# Patient Record
Sex: FEMALE | Race: BLACK OR AFRICAN AMERICAN | NOT HISPANIC OR LATINO | Employment: OTHER | URBAN - METROPOLITAN AREA
[De-identification: names, ages, dates, MRNs, and addresses within clinical notes are randomized per-mention and may not be internally consistent; named-entity substitution may affect disease eponyms.]

---

## 2023-03-17 DIAGNOSIS — K21.9 GASTROESOPHAGEAL REFLUX DISEASE WITHOUT ESOPHAGITIS: ICD-10-CM

## 2023-03-17 DIAGNOSIS — I10 PRIMARY HYPERTENSION: Primary | ICD-10-CM

## 2023-03-17 RX ORDER — PANTOPRAZOLE SODIUM 40 MG/1
40 TABLET, DELAYED RELEASE ORAL DAILY
Qty: 30 TABLET | Refills: 0 | Status: SHIPPED | OUTPATIENT
Start: 2023-03-17 | End: 2023-04-25 | Stop reason: SDUPTHER

## 2023-03-17 RX ORDER — HYDROCHLOROTHIAZIDE 25 MG/1
25 TABLET ORAL DAILY
Qty: 30 TABLET | Refills: 0 | Status: SHIPPED | OUTPATIENT
Start: 2023-03-17 | End: 2023-04-14 | Stop reason: SDUPTHER

## 2023-03-17 RX ORDER — PANTOPRAZOLE SODIUM 40 MG/1
40 TABLET, DELAYED RELEASE ORAL DAILY
COMMUNITY
End: 2023-03-17 | Stop reason: SDUPTHER

## 2023-03-17 RX ORDER — HYDROCHLOROTHIAZIDE 25 MG/1
1 TABLET ORAL DAILY
COMMUNITY
Start: 2021-03-19 | End: 2023-03-17 | Stop reason: SDUPTHER

## 2023-03-20 DIAGNOSIS — I10 PRIMARY HYPERTENSION: Primary | ICD-10-CM

## 2023-03-23 RX ORDER — ASPIRIN 81 MG/1
81 TABLET ORAL DAILY
COMMUNITY
End: 2023-03-23 | Stop reason: SDUPTHER

## 2023-03-23 RX ORDER — ASPIRIN 81 MG/1
81 TABLET ORAL DAILY
Qty: 90 TABLET | Refills: 1 | Status: SHIPPED | OUTPATIENT
Start: 2023-03-23 | End: 2023-06-30 | Stop reason: SDUPTHER

## 2023-04-14 DIAGNOSIS — I10 PRIMARY HYPERTENSION: ICD-10-CM

## 2023-04-14 RX ORDER — HYDROCHLOROTHIAZIDE 25 MG/1
25 TABLET ORAL DAILY
Qty: 90 TABLET | Refills: 0 | Status: SHIPPED | OUTPATIENT
Start: 2023-04-14 | End: 2023-07-20 | Stop reason: SDUPTHER

## 2023-04-25 DIAGNOSIS — K21.9 GASTROESOPHAGEAL REFLUX DISEASE WITHOUT ESOPHAGITIS: ICD-10-CM

## 2023-04-26 RX ORDER — PANTOPRAZOLE SODIUM 40 MG/1
40 TABLET, DELAYED RELEASE ORAL DAILY
Qty: 30 TABLET | Refills: 2 | Status: SHIPPED | OUTPATIENT
Start: 2023-04-26 | End: 2023-08-07 | Stop reason: SDUPTHER

## 2023-05-10 ENCOUNTER — OFFICE VISIT (OUTPATIENT)
Dept: PRIMARY CARE | Facility: CLINIC | Age: 68
End: 2023-05-10
Payer: MEDICARE

## 2023-05-10 VITALS
HEIGHT: 64 IN | SYSTOLIC BLOOD PRESSURE: 132 MMHG | WEIGHT: 176.6 LBS | OXYGEN SATURATION: 97 % | HEART RATE: 72 BPM | TEMPERATURE: 97.2 F | DIASTOLIC BLOOD PRESSURE: 78 MMHG | BODY MASS INDEX: 30.15 KG/M2

## 2023-05-10 DIAGNOSIS — H61.22 IMPACTED CERUMEN OF LEFT EAR: Primary | ICD-10-CM

## 2023-05-10 PROCEDURE — 1036F TOBACCO NON-USER: CPT | Performed by: NURSE PRACTITIONER

## 2023-05-10 PROCEDURE — 1159F MED LIST DOCD IN RCRD: CPT | Performed by: NURSE PRACTITIONER

## 2023-05-10 PROCEDURE — 99214 OFFICE O/P EST MOD 30 MIN: CPT | Performed by: NURSE PRACTITIONER

## 2023-05-10 PROCEDURE — 1160F RVW MEDS BY RX/DR IN RCRD: CPT | Performed by: NURSE PRACTITIONER

## 2023-05-10 ASSESSMENT — PATIENT HEALTH QUESTIONNAIRE - PHQ9
SUM OF ALL RESPONSES TO PHQ9 QUESTIONS 1 AND 2: 0
2. FEELING DOWN, DEPRESSED OR HOPELESS: NOT AT ALL
1. LITTLE INTEREST OR PLEASURE IN DOING THINGS: NOT AT ALL

## 2023-05-10 ASSESSMENT — PAIN SCALES - GENERAL: PAINLEVEL: 0-NO PAIN

## 2023-05-10 NOTE — PROGRESS NOTES
"Subjective   Patient ID: Sharon Caraballo is a 67 y.o. female who presents for Ear Fullness.    Presents 3-4 days of left ear hearing changes.  Hx of cerumen impaction. Had been trying to use wax softening drops before ear became blocked.  Denies any recent illness.    Ear Fullness          Review of Systems    Objective   /78 (BP Location: Left arm, Patient Position: Sitting, BP Cuff Size: Small adult)   Pulse 72   Temp 36.2 °C (97.2 °F) (Oral)   Ht 1.626 m (5' 4\")   Wt 80.1 kg (176 lb 9.6 oz)   SpO2 97%   BMI 30.31 kg/m²     Physical Exam  Vitals and nursing note reviewed.   HENT:      Right Ear: Hearing, tympanic membrane, ear canal and external ear normal.      Left Ear: External ear normal. Decreased hearing noted. There is impacted cerumen.      Ears:      Comments: Post irrigation: residual wax but TM visible        Assessment/Plan   Diagnoses and all orders for this visit:  Impacted cerumen of left ear  -     Ear cerumen removal; Future         "

## 2023-05-10 NOTE — PATIENT INSTRUCTIONS
Left ear irrigated in office today. Unable to completely clear wax.   Refer to ENT for further management.

## 2023-06-30 DIAGNOSIS — I10 PRIMARY HYPERTENSION: ICD-10-CM

## 2023-07-01 RX ORDER — ASPIRIN 81 MG/1
81 TABLET ORAL DAILY
Qty: 90 TABLET | Refills: 1 | Status: SHIPPED | OUTPATIENT
Start: 2023-07-01 | End: 2023-12-27 | Stop reason: SDUPTHER

## 2023-07-20 DIAGNOSIS — I10 PRIMARY HYPERTENSION: ICD-10-CM

## 2023-07-20 RX ORDER — HYDROCHLOROTHIAZIDE 25 MG/1
25 TABLET ORAL DAILY
Qty: 90 TABLET | Refills: 0 | Status: SHIPPED | OUTPATIENT
Start: 2023-07-20 | End: 2023-12-29

## 2023-08-07 DIAGNOSIS — Z12.2 SCREENING FOR LUNG CANCER: Primary | ICD-10-CM

## 2023-08-07 DIAGNOSIS — K21.9 GASTROESOPHAGEAL REFLUX DISEASE WITHOUT ESOPHAGITIS: ICD-10-CM

## 2023-08-07 DIAGNOSIS — Z87.891 HISTORY OF TOBACCO USE: ICD-10-CM

## 2023-08-07 RX ORDER — PANTOPRAZOLE SODIUM 40 MG/1
40 TABLET, DELAYED RELEASE ORAL DAILY
Qty: 30 TABLET | Refills: 2 | Status: SHIPPED | OUTPATIENT
Start: 2023-08-07 | End: 2023-12-29

## 2023-09-27 ENCOUNTER — APPOINTMENT (OUTPATIENT)
Dept: PRIMARY CARE | Facility: CLINIC | Age: 68
End: 2023-09-27
Payer: MEDICARE

## 2023-11-06 ENCOUNTER — OFFICE VISIT (OUTPATIENT)
Dept: PRIMARY CARE | Facility: CLINIC | Age: 68
End: 2023-11-06
Payer: MEDICARE

## 2023-11-06 VITALS
TEMPERATURE: 97.5 F | OXYGEN SATURATION: 97 % | HEART RATE: 65 BPM | HEIGHT: 64 IN | BODY MASS INDEX: 29.23 KG/M2 | WEIGHT: 171.2 LBS | DIASTOLIC BLOOD PRESSURE: 78 MMHG | SYSTOLIC BLOOD PRESSURE: 118 MMHG

## 2023-11-06 DIAGNOSIS — R73.09 OTHER ABNORMAL GLUCOSE: ICD-10-CM

## 2023-11-06 DIAGNOSIS — J43.9 PULMONARY EMPHYSEMA, UNSPECIFIED EMPHYSEMA TYPE (MULTI): ICD-10-CM

## 2023-11-06 DIAGNOSIS — Z00.00 ROUTINE GENERAL MEDICAL EXAMINATION AT HEALTH CARE FACILITY: Primary | ICD-10-CM

## 2023-11-06 DIAGNOSIS — Z78.0 ASYMPTOMATIC MENOPAUSAL STATE: ICD-10-CM

## 2023-11-06 DIAGNOSIS — E04.0 GOITER DIFFUSE, NONTOXIC: ICD-10-CM

## 2023-11-06 DIAGNOSIS — Z11.59 NEED FOR HEPATITIS C SCREENING TEST: ICD-10-CM

## 2023-11-06 DIAGNOSIS — Z13.6 SCREENING FOR CARDIOVASCULAR CONDITION: ICD-10-CM

## 2023-11-06 DIAGNOSIS — Z13.1 DIABETES MELLITUS SCREENING: ICD-10-CM

## 2023-11-06 PROBLEM — R12 HEARTBURN: Status: ACTIVE | Noted: 2023-11-06

## 2023-11-06 PROBLEM — E78.2 MIXED HYPERLIPIDEMIA: Status: ACTIVE | Noted: 2023-11-06

## 2023-11-06 PROBLEM — H91.92 HEARING DIFFICULTY OF LEFT EAR: Status: ACTIVE | Noted: 2023-11-06

## 2023-11-06 PROBLEM — Z87.448 HISTORY OF PROLAPSE OF BLADDER: Status: ACTIVE | Noted: 2023-11-06

## 2023-11-06 PROBLEM — R73.03 PREDIABETES: Status: ACTIVE | Noted: 2023-11-06

## 2023-11-06 PROBLEM — N39.46 URGE AND STRESS INCONTINENCE: Status: ACTIVE | Noted: 2023-11-06

## 2023-11-06 PROCEDURE — 1126F AMNT PAIN NOTED NONE PRSNT: CPT | Performed by: NURSE PRACTITIONER

## 2023-11-06 PROCEDURE — G0438 PPPS, INITIAL VISIT: HCPCS | Performed by: NURSE PRACTITIONER

## 2023-11-06 PROCEDURE — 93000 ELECTROCARDIOGRAM COMPLETE: CPT | Performed by: NURSE PRACTITIONER

## 2023-11-06 PROCEDURE — 1170F FXNL STATUS ASSESSED: CPT | Performed by: NURSE PRACTITIONER

## 2023-11-06 PROCEDURE — 3008F BODY MASS INDEX DOCD: CPT | Performed by: NURSE PRACTITIONER

## 2023-11-06 PROCEDURE — 1160F RVW MEDS BY RX/DR IN RCRD: CPT | Performed by: NURSE PRACTITIONER

## 2023-11-06 PROCEDURE — 1159F MED LIST DOCD IN RCRD: CPT | Performed by: NURSE PRACTITIONER

## 2023-11-06 PROCEDURE — 1036F TOBACCO NON-USER: CPT | Performed by: NURSE PRACTITIONER

## 2023-11-06 RX ORDER — ALBUTEROL SULFATE 90 UG/1
2 AEROSOL, METERED RESPIRATORY (INHALATION) EVERY 4 HOURS PRN
Qty: 18 G | Refills: 1 | Status: SHIPPED | OUTPATIENT
Start: 2023-11-06 | End: 2023-12-06

## 2023-11-06 RX ORDER — ACETAMINOPHEN 500 MG
1 TABLET ORAL DAILY
COMMUNITY

## 2023-11-06 ASSESSMENT — ACTIVITIES OF DAILY LIVING (ADL)
BATHING: INDEPENDENT
DRESSING: INDEPENDENT
MANAGING_FINANCES: INDEPENDENT
GROCERY_SHOPPING: INDEPENDENT
DOING_HOUSEWORK: INDEPENDENT
TAKING_MEDICATION: INDEPENDENT
GROCERY_SHOPPING: INDEPENDENT
BATHING: INDEPENDENT
DOING_HOUSEWORK: INDEPENDENT
DRESSING: INDEPENDENT
TAKING_MEDICATION: INDEPENDENT
MANAGING_FINANCES: INDEPENDENT

## 2023-11-06 ASSESSMENT — PATIENT HEALTH QUESTIONNAIRE - PHQ9
2. FEELING DOWN, DEPRESSED OR HOPELESS: NOT AT ALL
2. FEELING DOWN, DEPRESSED OR HOPELESS: NOT AT ALL
1. LITTLE INTEREST OR PLEASURE IN DOING THINGS: NOT AT ALL
SUM OF ALL RESPONSES TO PHQ9 QUESTIONS 1 AND 2: 0
1. LITTLE INTEREST OR PLEASURE IN DOING THINGS: NOT AT ALL
SUM OF ALL RESPONSES TO PHQ9 QUESTIONS 1 AND 2: 0

## 2023-11-06 ASSESSMENT — PAIN SCALES - GENERAL: PAINLEVEL: 0-NO PAIN

## 2023-11-06 NOTE — PATIENT INSTRUCTIONS
Return for fasting blood work.    Schedule screening Bone Density scan and CT of your lungs for lung cancer screening    Immunizations are up to date.    EKG in office today. No changes from 3/2022.    Schedule routine eye exam.    BP well controlled on current medications  Follow a low salt diet.  Continue to check BP at home periodically.    Refill rescue inhaler to use as needed for shortness of breath.  Stay quit from smoking.    Follow up in 6 months for BP check, sooner as needed.

## 2023-11-06 NOTE — PROGRESS NOTES
"Subjective   Reason for Visit: Sharon Caraballo is an 68 y.o. female here for a Medicare Wellness visit.     Past Medical, Surgical, and Family History reviewed and updated in chart.    Reviewed all medications by prescribing practitioner or clinical pharmacist (such as prescriptions, OTCs, herbal therapies and supplements) and documented in the medical record.    Colonoscopy 6/15/2022 repeat due in 5-7 years    Mammogram: 8/3/2023 normal    DEXA: Never    CT lung? Done in 2022 - recommended annual screening. Has not scheduled for this year    Recently had flu and pneumonia vaccinations.  No RSV yet    Not currently seeing any specialists.        Patient Care Team:  Lily Rivera MD as PCP - General     Review of Systems    Objective   Vitals:  /78 (BP Location: Left arm, Patient Position: Sitting, BP Cuff Size: Small adult)   Pulse 65   Temp 36.4 °C (97.5 °F) (Oral)   Ht 1.626 m (5' 4\")   Wt 77.7 kg (171 lb 3.2 oz)   SpO2 97%   BMI 29.39 kg/m²       Physical Exam  Vitals and nursing note reviewed.   Constitutional:       General: She is not in acute distress.     Appearance: Normal appearance. She is not toxic-appearing.   HENT:      Head: Normocephalic.      Right Ear: Tympanic membrane, ear canal and external ear normal.      Left Ear: Tympanic membrane, ear canal and external ear normal.      Nose: Nose normal.      Mouth/Throat:      Mouth: Mucous membranes are moist.      Pharynx: Oropharynx is clear.   Eyes:      Conjunctiva/sclera: Conjunctivae normal.   Neck:      Thyroid: Thyromegaly (R>L) present.   Cardiovascular:      Rate and Rhythm: Normal rate and regular rhythm.      Pulses: Normal pulses.      Heart sounds: Normal heart sounds. No murmur heard.  Pulmonary:      Effort: Pulmonary effort is normal. No respiratory distress.      Breath sounds: Normal breath sounds.   Abdominal:      General: Bowel sounds are normal.      Palpations: Abdomen is soft.      Tenderness: There is no abdominal " tenderness.   Musculoskeletal:         General: Normal range of motion.      Cervical back: Neck supple.      Right lower leg: No edema.      Left lower leg: No edema.   Lymphadenopathy:      Cervical: No cervical adenopathy.   Skin:     General: Skin is warm and dry.      Capillary Refill: Capillary refill takes less than 2 seconds.      Findings: No rash.   Neurological:      General: No focal deficit present.      Gait: Gait normal.   Psychiatric:         Mood and Affect: Mood normal.         Judgment: Judgment normal.         Assessment/Plan   Problem List Items Addressed This Visit       Emphysema/COPD (CMS/HCC)    Relevant Medications    albuterol 90 mcg/actuation inhaler    Goiter diffuse, nontoxic    Relevant Orders    TSH with reflex to Free T4 if abnormal     Other Visit Diagnoses       Routine general medical examination at health care facility    -  Primary    Relevant Orders    1 Year Follow Up In [facility] - PCP - Wellness Exam    Other abnormal glucose        Relevant Orders    Hemoglobin A1C    Diabetes mellitus screening        Relevant Orders    Hemoglobin A1C    Comprehensive Metabolic Panel    Screening for cardiovascular condition        Relevant Orders    ECG 12 lead (Completed)    Asymptomatic menopausal state        Relevant Orders    XR DEXA bone density    Need for hepatitis C screening test        Relevant Orders    Hepatitis C antibody    Adult BMI 29.0-29.9 kg/sq m

## 2023-11-07 ENCOUNTER — ANCILLARY PROCEDURE (OUTPATIENT)
Dept: RADIOLOGY | Facility: CLINIC | Age: 68
End: 2023-11-07
Payer: MEDICARE

## 2023-11-07 DIAGNOSIS — Z12.2 SCREENING FOR LUNG CANCER: ICD-10-CM

## 2023-11-07 DIAGNOSIS — Z87.891 HISTORY OF TOBACCO USE: ICD-10-CM

## 2023-11-07 PROCEDURE — 71271 CT THORAX LUNG CANCER SCR C-: CPT

## 2023-11-07 PROCEDURE — 71271 CT THORAX LUNG CANCER SCR C-: CPT | Performed by: RADIOLOGY

## 2023-11-17 ENCOUNTER — LAB (OUTPATIENT)
Dept: LAB | Facility: LAB | Age: 68
End: 2023-11-17
Payer: MEDICARE

## 2023-11-17 DIAGNOSIS — Z11.59 NEED FOR HEPATITIS C SCREENING TEST: ICD-10-CM

## 2023-11-17 DIAGNOSIS — E04.0 GOITER DIFFUSE, NONTOXIC: ICD-10-CM

## 2023-11-17 DIAGNOSIS — Z13.1 DIABETES MELLITUS SCREENING: ICD-10-CM

## 2023-11-17 DIAGNOSIS — R73.09 OTHER ABNORMAL GLUCOSE: ICD-10-CM

## 2023-11-17 LAB
ALBUMIN SERPL BCP-MCNC: 4.5 G/DL (ref 3.4–5)
ALP SERPL-CCNC: 57 U/L (ref 33–136)
ALT SERPL W P-5'-P-CCNC: 11 U/L (ref 7–45)
ANION GAP SERPL CALC-SCNC: 14 MMOL/L (ref 10–20)
AST SERPL W P-5'-P-CCNC: 14 U/L (ref 9–39)
BILIRUB SERPL-MCNC: 0.4 MG/DL (ref 0–1.2)
BUN SERPL-MCNC: 11 MG/DL (ref 6–23)
CALCIUM SERPL-MCNC: 9.4 MG/DL (ref 8.6–10.6)
CHLORIDE SERPL-SCNC: 106 MMOL/L (ref 98–107)
CO2 SERPL-SCNC: 28 MMOL/L (ref 21–32)
CREAT SERPL-MCNC: 0.87 MG/DL (ref 0.5–1.05)
EST. AVERAGE GLUCOSE BLD GHB EST-MCNC: 114 MG/DL
GFR SERPL CREATININE-BSD FRML MDRD: 74 ML/MIN/1.73M*2
GLUCOSE SERPL-MCNC: 76 MG/DL (ref 74–99)
HBA1C MFR BLD: 5.6 %
HCV AB SER QL: NONREACTIVE
POTASSIUM SERPL-SCNC: 4 MMOL/L (ref 3.5–5.3)
PROT SERPL-MCNC: 7.3 G/DL (ref 6.4–8.2)
SODIUM SERPL-SCNC: 144 MMOL/L (ref 136–145)
T4 FREE SERPL-MCNC: 1.4 NG/DL (ref 0.78–1.48)
TSH SERPL-ACNC: 0.23 MIU/L (ref 0.44–3.98)

## 2023-11-17 PROCEDURE — 80053 COMPREHEN METABOLIC PANEL: CPT

## 2023-11-17 PROCEDURE — 83036 HEMOGLOBIN GLYCOSYLATED A1C: CPT

## 2023-11-17 PROCEDURE — 86803 HEPATITIS C AB TEST: CPT

## 2023-11-17 PROCEDURE — 84439 ASSAY OF FREE THYROXINE: CPT

## 2023-11-17 PROCEDURE — 36415 COLL VENOUS BLD VENIPUNCTURE: CPT

## 2023-11-17 PROCEDURE — 84443 ASSAY THYROID STIM HORMONE: CPT

## 2023-11-20 DIAGNOSIS — E04.0 GOITER DIFFUSE, NONTOXIC: Primary | ICD-10-CM

## 2023-12-01 ENCOUNTER — APPOINTMENT (OUTPATIENT)
Dept: RADIOLOGY | Facility: HOSPITAL | Age: 68
End: 2023-12-01
Payer: MEDICARE

## 2023-12-04 ENCOUNTER — HOSPITAL ENCOUNTER (OUTPATIENT)
Dept: RADIOLOGY | Facility: HOSPITAL | Age: 68
Discharge: HOME | End: 2023-12-04
Payer: MEDICARE

## 2023-12-04 DIAGNOSIS — E04.0 GOITER DIFFUSE, NONTOXIC: ICD-10-CM

## 2023-12-04 PROCEDURE — 76536 US EXAM OF HEAD AND NECK: CPT | Performed by: RADIOLOGY

## 2023-12-04 PROCEDURE — 76536 US EXAM OF HEAD AND NECK: CPT

## 2023-12-27 DIAGNOSIS — I10 PRIMARY HYPERTENSION: ICD-10-CM

## 2023-12-28 RX ORDER — ASPIRIN 81 MG/1
81 TABLET ORAL DAILY
Qty: 90 TABLET | Refills: 0 | Status: SHIPPED | OUTPATIENT
Start: 2023-12-28 | End: 2024-03-26 | Stop reason: SDUPTHER

## 2024-02-05 DIAGNOSIS — K21.9 GASTROESOPHAGEAL REFLUX DISEASE WITHOUT ESOPHAGITIS: ICD-10-CM

## 2024-02-06 ENCOUNTER — HOSPITAL ENCOUNTER (OUTPATIENT)
Dept: RADIOLOGY | Facility: CLINIC | Age: 69
Discharge: HOME | End: 2024-02-06
Payer: MEDICARE

## 2024-02-06 ENCOUNTER — APPOINTMENT (OUTPATIENT)
Dept: RADIOLOGY | Facility: CLINIC | Age: 69
End: 2024-02-06
Payer: MEDICARE

## 2024-02-06 DIAGNOSIS — K21.9 GASTROESOPHAGEAL REFLUX DISEASE WITHOUT ESOPHAGITIS: ICD-10-CM

## 2024-02-06 DIAGNOSIS — Z78.0 ASYMPTOMATIC MENOPAUSAL STATE: ICD-10-CM

## 2024-02-06 DIAGNOSIS — I10 PRIMARY HYPERTENSION: ICD-10-CM

## 2024-02-06 PROCEDURE — 77080 DXA BONE DENSITY AXIAL: CPT | Performed by: RADIOLOGY

## 2024-02-06 PROCEDURE — 77080 DXA BONE DENSITY AXIAL: CPT

## 2024-02-06 RX ORDER — PANTOPRAZOLE SODIUM 40 MG/1
40 TABLET, DELAYED RELEASE ORAL DAILY
Qty: 30 TABLET | Refills: 0 | Status: SHIPPED | OUTPATIENT
Start: 2024-02-06 | End: 2024-02-06 | Stop reason: SDUPTHER

## 2024-02-07 RX ORDER — CARVEDILOL 6.25 MG/1
6.25 TABLET ORAL
Qty: 60 TABLET | Refills: 11 | Status: SHIPPED | OUTPATIENT
Start: 2024-02-07 | End: 2024-04-10 | Stop reason: SINTOL

## 2024-02-07 RX ORDER — PANTOPRAZOLE SODIUM 40 MG/1
40 TABLET, DELAYED RELEASE ORAL DAILY
Qty: 90 TABLET | Refills: 1 | Status: SHIPPED | OUTPATIENT
Start: 2024-02-07 | End: 2024-08-05

## 2024-02-07 RX ORDER — HYDROCHLOROTHIAZIDE 25 MG/1
TABLET ORAL
Qty: 90 TABLET | Refills: 1 | Status: SHIPPED | OUTPATIENT
Start: 2024-02-07

## 2024-02-28 ENCOUNTER — TELEPHONE (OUTPATIENT)
Dept: PRIMARY CARE | Facility: CLINIC | Age: 69
End: 2024-02-28
Payer: MEDICARE

## 2024-02-29 DIAGNOSIS — I25.10 ATHEROSCLEROSIS OF NATIVE CORONARY ARTERY OF NATIVE HEART, UNSPECIFIED WHETHER ANGINA PRESENT: ICD-10-CM

## 2024-02-29 DIAGNOSIS — I25.10 CORONARY ARTERY CALCIFICATION: Primary | ICD-10-CM

## 2024-02-29 DIAGNOSIS — I25.84 CORONARY ARTERY CALCIFICATION: Primary | ICD-10-CM

## 2024-03-19 ENCOUNTER — TELEPHONE (OUTPATIENT)
Dept: PRIMARY CARE | Facility: CLINIC | Age: 69
End: 2024-03-19
Payer: MEDICARE

## 2024-03-20 DIAGNOSIS — E78.2 MIXED HYPERLIPIDEMIA: Primary | ICD-10-CM

## 2024-03-20 RX ORDER — ATORVASTATIN CALCIUM 80 MG/1
80 TABLET, FILM COATED ORAL DAILY
Qty: 100 TABLET | Refills: 3 | Status: SHIPPED | OUTPATIENT
Start: 2024-03-20 | End: 2025-04-24

## 2024-03-26 DIAGNOSIS — I10 PRIMARY HYPERTENSION: ICD-10-CM

## 2024-03-26 RX ORDER — ASPIRIN 81 MG/1
81 TABLET ORAL DAILY
Qty: 90 TABLET | Refills: 3 | Status: SHIPPED | OUTPATIENT
Start: 2024-03-26

## 2024-04-10 ENCOUNTER — OFFICE VISIT (OUTPATIENT)
Dept: CARDIOLOGY | Facility: CLINIC | Age: 69
End: 2024-04-10
Payer: MEDICARE

## 2024-04-10 VITALS
WEIGHT: 170 LBS | HEIGHT: 63 IN | SYSTOLIC BLOOD PRESSURE: 168 MMHG | DIASTOLIC BLOOD PRESSURE: 76 MMHG | BODY MASS INDEX: 30.12 KG/M2

## 2024-04-10 DIAGNOSIS — I25.10 CORONARY ARTERY CALCIFICATION: ICD-10-CM

## 2024-04-10 DIAGNOSIS — I25.84 CORONARY ARTERY CALCIFICATION: ICD-10-CM

## 2024-04-10 DIAGNOSIS — I25.84 CORONARY ATHEROSCLEROSIS DUE TO CALCIFIED CORONARY LESION: ICD-10-CM

## 2024-04-10 DIAGNOSIS — I25.10 CORONARY ATHEROSCLEROSIS DUE TO CALCIFIED CORONARY LESION: ICD-10-CM

## 2024-04-10 DIAGNOSIS — I10 PRIMARY HYPERTENSION: ICD-10-CM

## 2024-04-10 DIAGNOSIS — E78.2 MIXED HYPERLIPIDEMIA: Primary | ICD-10-CM

## 2024-04-10 PROCEDURE — 99204 OFFICE O/P NEW MOD 45 MIN: CPT

## 2024-04-10 PROCEDURE — 3008F BODY MASS INDEX DOCD: CPT

## 2024-04-10 PROCEDURE — 1159F MED LIST DOCD IN RCRD: CPT

## 2024-04-10 PROCEDURE — 1036F TOBACCO NON-USER: CPT

## 2024-04-10 PROCEDURE — 3078F DIAST BP <80 MM HG: CPT

## 2024-04-10 PROCEDURE — 1160F RVW MEDS BY RX/DR IN RCRD: CPT

## 2024-04-10 PROCEDURE — 3077F SYST BP >= 140 MM HG: CPT

## 2024-04-10 RX ORDER — TELMISARTAN 40 MG/1
40 TABLET ORAL DAILY
Qty: 30 TABLET | Refills: 11 | Status: SHIPPED | OUTPATIENT
Start: 2024-04-10 | End: 2025-04-10

## 2024-04-10 NOTE — PROGRESS NOTES
Location of visit: [address]   Type of Visit: Established - Last Seen: Dr. Rodriguez    Chief Complaint:  Dyspnea with exertion    History Of Present Illness:    Sharon Caraballo is a 68 y.o. female, with history significant for high coronary calcium score, who visits Cardiology today as a follow up visit  for Dyspnea with exertion.    Previous information   Ms. Caraballo is a 66-year-old female with past medical history significant for hypertension, dyslipidemia, COPD referred to our cardiology clinic after a CT scan of the chest showed severe calcification of her coronary arteries.  Patient reports having shortness of breath on exertion for the last 2 years. She reports that her shortness of breath and chest pain has been slowly progressing in the last few months. She now gets chest pain and dyspnea after walking with her dog at a distance of 3-4 blocks. She was referred for LHC and Echocardiogram. TTE showed normal EF without any significant valvular dysfunction. LHC showed normal resting LVEDP and mild-mod non obstructive CAD.   Patient had PFTs showed mod obstruction. Patient denies any orthopnea, PND's, lower extremity swelling, dizziness, palpitation, syncope or presyncope.  Patient appears to be heavy smoker, but stopped 10 years ago.   Patient was recently started on atorvastatin 40 for her dyslipidemia, Aspirin.   Patient reports compliance with her blood pressure medication and she reports that she usually check her blood pressure at home and is usually around 120-140. However she reports missing few evening doses of her Carvedilol.   Review of systems negative except above.    Social history: Former heavy smoker.  Family history: Mother has a recent stroke     66-year-old female with past medical history significant for hypertension, dyslipidemia, COPD referred to our cardiology clinic after a CT scan of the chest showed severe calcification of her coronary arteries.  Patient reported exertional dyspnea +-  angina. She was referred for LHC and Echocardiogram. TTE showed normal EF without any significant valvular dysfunction. LHC showed normal resting LVEDP and mild-mod non obstructive CAD.   Patient had PFTs showed mod obstruction.       # Exertional dyspnea  - Symptoms appear more related to COPD/Asthma rather than obstructive coronary disease or HFpEF.   - Referral to pulmonary medicine was made.   - Patient needs aggressive risk factor modifications.   -Continue aspirin 81 mg daily and atorvastatin 40 mg daily.  -Repeat Lipid profile today. LDL target should be below 70.      ===========================  Today's visit    She reports that her shortness of breath has been stable but it hasn't improve. She keeps havign dyspnea after walking at a distance of 3-4 blocks. No other relevant cardiovascular symptoms.  She hasn't been complying with her medication    Blood pressure today: 168/76 mmHg    Past Medical History:  She has a past medical history of Personal history of other diseases of the nervous system and sense organs (05/04/2021) and Shortness of breath (03/19/2021).    Past Surgical History:  She has a past surgical history that includes Other surgical history (05/13/2022).    Social History:  She reports that she has never smoked. She has never used smokeless tobacco. She reports current alcohol use. She reports that she does not currently use drugs.    Family History:  No family history on file.  Allergies:  Patient has no known allergies.    Outpatient Medications:  Current Outpatient Medications   Medication Instructions    albuterol 90 mcg/actuation inhaler 2 puffs, inhalation, Every 4 hours PRN    aspirin 81 mg, oral, Daily    atorvastatin (LIPITOR) 80 mg, oral, Daily    carvedilol (COREG) 6.25 mg, oral, 2 times daily with meals    cholecalciferol (Vitamin D-3) 5,000 Units tablet 1 tablet, oral, Daily    hydroCHLOROthiazide (HYDRODiuril) 25 mg tablet Take 1 (ONE) tablet (25 mg) by mouth once daily     "pantoprazole (PROTONIX) 40 mg, oral, Daily     Last Recorded Vitals:  There were no vitals filed for this visit.  Physical Exam:      11/6/2023    12:36 PM 7/31/2023     1:39 PM 5/10/2023     4:55 PM 1/16/2023     2:45 PM 8/10/2022    11:33 AM 7/26/2022     3:35 PM   Vitals   Systolic 118 147 132 155 170 136   Diastolic 78 82 78 75 91 72   Heart Rate 65 78 72 79 62 66   Temp 36.4 °C (97.5 °F)  36.2 °C (97.2 °F) 36.2 °C (97.1 °F) 36 °C (96.8 °F) 35.9 °C (96.6 °F)   Resp  17       Height (in) 1.626 m (5' 4\") 1.6 m (5' 3\") 1.626 m (5' 4\") 1.6 m (5' 3\") 1.6 m (5' 3\") 1.6 m (5' 3\")   Weight (lb) 171.2 170.38 176.6 177.25 168.5 168.13   BMI 29.39 kg/m2 30.18 kg/m2 30.31 kg/m2 31.4 kg/m2 29.85 kg/m2 29.78 kg/m2   BSA (m2) 1.87 m2 1.85 m2 1.9 m2 1.89 m2 1.84 m2 1.84 m2   Visit Report Report  Report        Wt Readings from Last 5 Encounters:   11/06/23 77.7 kg (171 lb 3.2 oz)   07/31/23 77.3 kg (170 lb 6 oz)   05/10/23 80.1 kg (176 lb 9.6 oz)   01/16/23 80.4 kg (177 lb 4 oz)   08/10/22 76.4 kg (168 lb 8 oz)     General: Sitting up comfortably in chair; in no apparent distress.  HEENT: Normocephalic; atraumatic. Well hydrated.  Eyes: Anicteric sclera. Extraocular movement intact.  Neck: Supple; no thyromegaly; normal jugular venous pressure, no bruits.  Respiratory: Bilateral air entry equal. No wheezing.  Cardiovascular: Normal S1, S2; no murmurs auscultated.  Abdomen: Nondistended; nontender. (+) bowel sounds.  Extremities: No peripheral edema present. Pulses 2+ diffusely.  Neurological: Oriented to time, place, and person; nonfocal.  Psychiatric: Normal affect.     Last Labs Reviewed:  CBC -  Recent Labs     02/23/22  1159 04/14/21  1204   WBC 4.4 3.9*   HGB 14.7 13.8   HCT 43.8 41.7    285   MCV 90 90     CMP -  Recent Labs     11/17/23  1033 01/20/23  1302 05/13/22  1056 02/23/22  1159 10/15/21  1413    143 140 139 144   K 4.0 4.2 5.0 4.2 4.5    105 103 100 106   CO2 28 33* 30 31 30   ANIONGAP 14 9* 12 " "12 13   BUN 11 15 11 16 13   CREATININE 0.87 0.97 0.84 0.93 0.99   EGFR 74  --   --   --   --    CALCIUM 9.4 10.1 10.0 10.5 10.2     Recent Labs     11/17/23  1033 05/13/22  1056 02/23/22  1159 04/14/21  1204   ALBUMIN 4.5 4.5 5.0 4.7   ALKPHOS 57 68 67 70   ALT 11 14 21 15   AST 14 17 19 16   BILITOT 0.4 0.4 0.6 0.6     LIPID PANEL -   Recent Labs     01/20/23  1302 05/13/22  1056 04/20/22  1122   CHOL 247* 157 168   LDLF 146* 85 81   HDL 83.1 58.1 68.1   TRIG 91 69 97     COAGULATION PANEL -  Recent Labs     02/23/22  1159   INR 1.0     HEME/ENDO -  Recent Labs     11/17/23  1033 01/20/23  1302 02/23/22  1159 04/14/21  1204   TSH 0.23*  --  0.31* 0.17*   HGBA1C 5.6 5.9* 6.0*  --    FREET4 1.40  --  1.46 1.39     CARDIOVASCULAR  No results for input(s): \"LDH\", \"CKMB\", \"TROPHS\", \"BNP\", \"CRP\" in the last 32783 hours.    No lab exists for component: \"CK\", \"CKMBP\", \"CRPUS\", \"USCRP\"  Last Cardiology/Imaging Tests Personally Reviewed (if images available) and Interpreted:  ECG:  Encounter Date: 11/06/23   ECG 12 lead    Narrative    HR 53 Normal sinus rhythm  No changes from 3/2022   ECG 12 lead 11/06/2023    Echocardiogram:  ECHOCARDIOGRAM     Narrative  Ordered by an unspecified provider.  No results found for this or any previous visit from the past 1095 days.    Cath:  Adult Cath     Narrative  [facility], Cath Lab, [address] [place] [id number]    Cardiovascular Catheterization Report    Patient Name:     STEWART CLAY Performing Physician:  [id number] Martins MD  Study Date:       3/17/2022    Verifying Physician:   Corazon Martins MD  MRN/PID:          [id number]     Cardiologist/Co-scrub:  Accession/Order#: [id number]    Fellow:                Jaspreet Hinton MD  YOB: 1955   Fellow:  Gender:           F            Referring Physician:   SHARON MARTINS  Admit Date:                    Referring Physician:   gus  Surgeon:                       Referring Physician:   [id number] " Gina Mueller MD      Study: Left Heart Catheterization      Indications:  STEWART CLAY is a 66 year old female who presents with dyslipidemia and hypertension. New onset angina <=2 months, with a chest pain assessment of atypical angina. Study performed as an elective cath procedure.    Appropriate Use Criteria:  Unstable angina with intermediate risk score; AUC score = 8.    Medical History:  Stress test performed: No. CTA performed: No. Agatston accessed: Yes. LVEF Assessed: Yes. LVEF = 60%.    Procedure Description Comments:  After informed consent was obtained, patient brought to the cardiac catheterization laboratory., Was performed. The right wrist was prepped and draped in usual fashion. 2% Xylocaine was after locally. A Seldinger technique was used to introduce 6 Japanese sheath in the right radial artery. A 5 Japanese [place] catheter was advanced into the ascending aorta. Selective coronary angiography was performed. This is then advanced over a J-wire into the left ventricle. Left ventricular end-diastolic pressure was obtained. A TR band was used to achieve hemostasis of the right radial artery. The patient tolerated the procedure without difficulty.    Patient received intra-arterial nitroglycerin. She received intravenous heparin.    Coronary Angiography:  The coronary circulation is right dominant.    Coronary Angiography Comments:  Left main coronary artery contains mild luminal irregularities.    Left anterior descending artery contains a 30% ostial stenosis. First and second diagonal arteries are diminutive with mild luminal irregularities.    Left circumflex artery contains mild disease proximally. The AV groove circumflex in the midportion contains a 50% stenosis, although in WALTER caudal view does appear more stenotic. This is likely a vessel overlap from foreshortening. The first obtuse marginal artery contains mild luminal irregularities. Second obtuse marginal artery contains mild luminal  irregularities.    Right coronary artery contains a 30 to 40% long stenosis in its midportion. Posterior descending artery posterolateral branches contain mild luminal irregularities.    Complications:  No in-lab complications observed.    Cardiac Cath Transition of Care Summary:  Post Procedure Diagnosis: Cad.  Blood Loss:               Estimated blood loss during the procedure was < 20  mls.  Specimens Removed:        Number of specimen(s) removed: none.      Recommendations:  Maximize medical therapy.  Lipid lowering agent or Statin therapy.  Further recommendations per primary team.    ____________________________________________________________________________________  CONCLUSIONS:  1. Mild to moderate, nonobstructive coronary artery disease.  2. Normal left heart filling pressures.    ____________________________________________________________________________________    Stress Test:  No results found for this or any previous visit from the past 1825 days.  No results found for this or any previous visit from the past 1095 days.    Cardiac CT/MRI:  No results found for this or any previous visit from the past 1825 days.  No results found for this or any previous visit from the past 1095 days.    Other CT:      CV RISK FACTORS:   # Hypertension: Last BP:  .  # Hyperlipidemia: Last Tchol No results found for requested labs within last 365 days. / LDL No results found for requested labs within last 365 days. / HDL No results found for requested labs within last 365 days. / TRIG No results found for requested labs within last 365 days. (No results in last year.).  # Type II Diabetes Mellitus: Last A1c 5.6 (11/17/2023: 10:33 AM).  # Obesity: Last BMI:  .  # CKD: Last BUN/Cr (GFR): 11/0.87 (74), 11/17/2023: 10:33 AM.    ASCV RISK:  The 10-year ASCVD risk score (Llaitha REEVES, et al., 2019) is: 8.1%    Values used to calculate the score:      Age: 68 years      Sex: Female      Is Non- : Yes       Diabetic: No      Tobacco smoker: No      Systolic Blood Pressure: 118 mmHg      Is BP treated: No      HDL Cholesterol: 83.1 mg/dL      Total Cholesterol: 247 mg/dL    Assessment/Plan     68-year-old female with past medical history significant for hypertension, dyslipidemia, COPD and CT scan of the chest that showed severe calcification of her coronary arteries.  Patient reported exertional dyspnea +- angina.   On 2022 she was referred for LHC and Echocardiogram. TTE showed normal EF without any significant valvular dysfunction. LHC showed normal resting LVEDP and mild-mod non obstructive CAD.   Patient had PFTs showed mod obstruction.       Exertional dyspnea  - Symptoms appear more related to COPD/Asthma rather than obstructive coronary disease or HFpEF.   - New cardiac test are going to be done, because she haven't been complaint with medication. In case the test are negative she is going to be referred to pulmonologist  -Stop Carvedilol  -Start Telmisartan 40mg  -Continue aspirin 81 mg daily and atorvastatin 80 mg daily.  -Repeat Lipid profile in 3 month. LDL target should be below 70.   - Patient will follow up with me in the Cardiology office once the results are available  - I spent 45 minutes assessing the case between pre-charting, face-to-face patient interaction, and documentation    Miguel Benavidez MD

## 2024-04-18 PROBLEM — H61.23 IMPACTED CERUMEN OF BOTH EARS: Status: ACTIVE | Noted: 2024-04-18

## 2024-04-18 PROBLEM — R05.9 COUGH: Status: ACTIVE | Noted: 2024-04-18

## 2024-04-18 PROBLEM — H92.02 OTALGIA OF LEFT EAR: Status: ACTIVE | Noted: 2024-04-18

## 2024-04-22 ENCOUNTER — APPOINTMENT (OUTPATIENT)
Dept: OTOLARYNGOLOGY | Facility: CLINIC | Age: 69
End: 2024-04-22
Payer: MEDICARE

## 2024-04-25 ENCOUNTER — HOSPITAL ENCOUNTER (OUTPATIENT)
Dept: RADIOLOGY | Facility: HOSPITAL | Age: 69
Discharge: HOME | End: 2024-04-25
Payer: MEDICARE

## 2024-04-25 ENCOUNTER — HOSPITAL ENCOUNTER (OUTPATIENT)
Dept: CARDIOLOGY | Facility: HOSPITAL | Age: 69
Discharge: HOME | End: 2024-04-25
Payer: MEDICARE

## 2024-04-25 VITALS
SYSTOLIC BLOOD PRESSURE: 168 MMHG | HEIGHT: 63 IN | DIASTOLIC BLOOD PRESSURE: 76 MMHG | WEIGHT: 170 LBS | BODY MASS INDEX: 30.12 KG/M2

## 2024-04-25 DIAGNOSIS — I25.10 CORONARY ARTERY CALCIFICATION: ICD-10-CM

## 2024-04-25 DIAGNOSIS — I25.84 CORONARY ATHEROSCLEROSIS DUE TO CALCIFIED CORONARY LESION: ICD-10-CM

## 2024-04-25 DIAGNOSIS — I25.84 CORONARY ARTERY CALCIFICATION: ICD-10-CM

## 2024-04-25 DIAGNOSIS — I10 PRIMARY HYPERTENSION: ICD-10-CM

## 2024-04-25 DIAGNOSIS — E78.2 MIXED HYPERLIPIDEMIA: ICD-10-CM

## 2024-04-25 DIAGNOSIS — I25.10 CORONARY ATHEROSCLEROSIS DUE TO CALCIFIED CORONARY LESION: ICD-10-CM

## 2024-04-25 LAB
AORTIC VALVE MEAN GRADIENT: 4.4 MMHG
AORTIC VALVE PEAK VELOCITY: 1.49 M/S
AV PEAK GRADIENT: 8.9 MMHG
AVA (PEAK VEL): 2.24 CM2
AVA (VTI): 2.2 CM2
EJECTION FRACTION APICAL 4 CHAMBER: 63.1
LEFT ATRIUM VOLUME AREA LENGTH INDEX BSA: 33.2 ML/M2
LEFT VENTRICLE INTERNAL DIMENSION DIASTOLE: 4.71 CM (ref 3.5–6)
LEFT VENTRICULAR OUTFLOW TRACT DIAMETER: 2.04 CM
LV EJECTION FRACTION BIPLANE: 64 %
MITRAL VALVE E/A RATIO: 0.83
MITRAL VALVE E/E' RATIO: 7.83
RIGHT VENTRICLE FREE WALL PEAK S': 10 CM/S
RIGHT VENTRICLE PEAK SYSTOLIC PRESSURE: 25.6 MMHG
TRICUSPID ANNULAR PLANE SYSTOLIC EXCURSION: 1.8 CM

## 2024-04-25 PROCEDURE — A9502 TC99M TETROFOSMIN: HCPCS

## 2024-04-25 PROCEDURE — 93306 TTE W/DOPPLER COMPLETE: CPT

## 2024-04-25 PROCEDURE — 3430000001 HC RX 343 DIAGNOSTIC RADIOPHARMACEUTICALS

## 2024-04-25 PROCEDURE — 78452 HT MUSCLE IMAGE SPECT MULT: CPT | Performed by: STUDENT IN AN ORGANIZED HEALTH CARE EDUCATION/TRAINING PROGRAM

## 2024-04-25 PROCEDURE — 78452 HT MUSCLE IMAGE SPECT MULT: CPT

## 2024-04-25 PROCEDURE — 93017 CV STRESS TEST TRACING ONLY: CPT

## 2024-04-25 RX ADMIN — TETROFOSMIN 34.1 MILLICURIE: 0.23 INJECTION, POWDER, LYOPHILIZED, FOR SOLUTION INTRAVENOUS at 11:11

## 2024-04-25 RX ADMIN — TETROFOSMIN 10.8 MILLICURIE: 0.23 INJECTION, POWDER, LYOPHILIZED, FOR SOLUTION INTRAVENOUS at 09:45

## 2024-05-03 ENCOUNTER — APPOINTMENT (OUTPATIENT)
Dept: PRIMARY CARE | Facility: CLINIC | Age: 69
End: 2024-05-03
Payer: MEDICARE

## 2024-05-22 ENCOUNTER — APPOINTMENT (OUTPATIENT)
Dept: CARDIOLOGY | Facility: HOSPITAL | Age: 69
End: 2024-05-22
Payer: MEDICARE

## 2024-06-03 ENCOUNTER — APPOINTMENT (OUTPATIENT)
Dept: PULMONOLOGY | Facility: CLINIC | Age: 69
End: 2024-06-03
Payer: MEDICARE

## 2024-06-03 ENCOUNTER — APPOINTMENT (OUTPATIENT)
Dept: CARDIOLOGY | Facility: HOSPITAL | Age: 69
End: 2024-06-03
Payer: MEDICARE

## 2024-06-10 ENCOUNTER — APPOINTMENT (OUTPATIENT)
Dept: OTOLARYNGOLOGY | Facility: CLINIC | Age: 69
End: 2024-06-10
Payer: MEDICARE

## 2024-06-11 ENCOUNTER — OFFICE VISIT (OUTPATIENT)
Dept: PULMONOLOGY | Facility: CLINIC | Age: 69
End: 2024-06-11
Payer: MEDICARE

## 2024-06-11 VITALS
HEART RATE: 62 BPM | DIASTOLIC BLOOD PRESSURE: 78 MMHG | WEIGHT: 170.8 LBS | BODY MASS INDEX: 30.26 KG/M2 | OXYGEN SATURATION: 98 % | RESPIRATION RATE: 20 BRPM | SYSTOLIC BLOOD PRESSURE: 157 MMHG | TEMPERATURE: 97 F

## 2024-06-11 DIAGNOSIS — R06.09 DOE (DYSPNEA ON EXERTION): Primary | ICD-10-CM

## 2024-06-11 DIAGNOSIS — F17.210 NICOTINE DEPENDENCE, CIGARETTES, UNCOMPLICATED: ICD-10-CM

## 2024-06-11 DIAGNOSIS — J43.9 PULMONARY EMPHYSEMA, UNSPECIFIED EMPHYSEMA TYPE (MULTI): ICD-10-CM

## 2024-06-11 PROCEDURE — 99214 OFFICE O/P EST MOD 30 MIN: CPT | Performed by: INTERNAL MEDICINE

## 2024-06-11 PROCEDURE — 3008F BODY MASS INDEX DOCD: CPT | Performed by: INTERNAL MEDICINE

## 2024-06-11 PROCEDURE — 1036F TOBACCO NON-USER: CPT | Performed by: INTERNAL MEDICINE

## 2024-06-11 PROCEDURE — 1160F RVW MEDS BY RX/DR IN RCRD: CPT | Performed by: INTERNAL MEDICINE

## 2024-06-11 PROCEDURE — 3077F SYST BP >= 140 MM HG: CPT | Performed by: INTERNAL MEDICINE

## 2024-06-11 PROCEDURE — 3078F DIAST BP <80 MM HG: CPT | Performed by: INTERNAL MEDICINE

## 2024-06-11 PROCEDURE — 1159F MED LIST DOCD IN RCRD: CPT | Performed by: INTERNAL MEDICINE

## 2024-06-11 RX ORDER — GLYCOPYRROLATE AND FORMOTEROL FUMARATE 9; 4.8 UG/1; UG/1
2 AEROSOL, METERED RESPIRATORY (INHALATION) 2 TIMES DAILY
Qty: 10.7 G | Refills: 11 | Status: SHIPPED | OUTPATIENT
Start: 2024-06-11

## 2024-06-11 NOTE — PROGRESS NOTES
@PULMONARY FOLLOW-UP@       PROBLEM:  TONG and COPD    ASSESSMENT:  The patient is a 68-year-old with hypertension, hyperlipidemia none obstructive CAD with COPD and moderate airflow obstruction with a bronchodilator response.  She is complaining of shortness of breath and she stopped smoking around 10 years ago.  The last screening CT scan of the chest was around 8 months ago.  It would be nice to get her on controller medications and hopefully now that the cost of controlled medications that then reduced around $35 a month she can take advantage of this.    PLAN:   I have ordered complete PFTs and a 6-minute walk.  They can be scheduled by calling     I have ordered Bevespi to utilize 2 puffs twice daily; continue to use albuterol 2 puffs every 4-6 hours as needed.    Annual screening CT scan of the chest later in the year      HISTORY OF PRESENT ILLNESS:  The patient is a 68-year-old with a history of hypertension, hyperlipidemia, coronary artery calcification and COPD with emphysema. She has been referred for the latter complaining of shortness of breath. The latter has been noted for the last several years and she recently saw cardiology reporting some chest pain. A catheterization revealed nonobstructive CAD. Her PFTs were said to reveal moderate airflow obstruction. She smoked up until 10 years ago. The low-dose screening CT scan from June 2, 2022 outline no suspicious nodularity. There was therapy severe upper lobe predominant emphysema with severe coronary calcifications.     PFTs from March 17, 2022 revealed an FVC of 2.41 L 97% predicted with an FEV1 of 1.06 L 54% predicted. The latter increased 20% with bronchodilator ministration. The TLC was 116% predicted and the DLCO was 63% predicted. The RV/TLC was 111% predicted.     The echocardiogram from March 2, 2022 revealed a left ventricular ejection fraction of 60%. There was diastolic dysfunction. The right ventricle was normal.    As noted on  July 20th 2022, the patient reports that she has had the sensation of shortness of breath over the last year or 2. There is also the sensation of substernal pressure sensation and then she underwent evaluation from a cardiac perspective as outlined above. She has been determined to have nonobstructive coronary disease. She smoked for close to 30 years about a pack per day stopping in 2002. She started when she was 20 years of age. 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 She has noted shortness of breath climbing up steps and if she exerts herself on level ground. Asymptomatic. Her weight is unchanged but definitely is up. There is a family history of heart disease in her mother and sister. They both up at stent insertions. She intermittently wheezes without known history of asthma. She does note some wheezing at night.  At the last visit I told her to begin Advair regularly to begin pulmonary rehab and lose weight.    The screening CT scan from November 8, 2023 revealed the following  1. There are no suspicious parenchymal lung nodules  2. Recommend continued 1 year low-dose chest CT for surveillance.  3. Marked thyromegaly and heterogeneity unchanged when compared to a  study of 06/02/2022  4. Severe coronary artery calcifications and correlate with coronary  artery disease risk factors      She saw cardiology on April 10, 2024 with an elevated calcium score complaining of shortness of breath.  She was on atorvastatin 40 mg a day along with aspirin.  Her cholesterol was 247 mg/dL on January 20, 2023.  It was noted that her previous catheterization from March 17, 2022 revealed nonobstructive coronary disease and she also has type 2 diabetes and obesity and chronic kidney disease.  It was felt that her shortness of breath probably related to her COPD and asthma and she is referred back for evaluation.  Her carvedilol was  stopped and she was started on telmisartan 40 mg a day.    The nuclear stress test from April 25, 2024 revealed the following     1. Adequate level of stress achieved.   2. No clinical or electrocardiographic evidence for ischemia at a maximal workload.   3. Nuclear image results revealed no evidence of ischemia with ejection fraction of 65%        The patient is describing shortness of breath although she tries to workout regularly.  She is short of breath walking up steps and there has been some worsening.  She has not been able to use controller medications because of expense.  She does have moderate airflow obstruction with a bronchodilator response.  DLCO is down to 63% predicted.  Her cardiac evaluation was negative for ischemia.    No Known Allergies         Current Outpatient Medications:     aspirin 81 mg EC tablet, Take 1 tablet (81 mg) by mouth once daily., Disp: 90 tablet, Rfl: 3    atorvastatin (Lipitor) 80 mg tablet, Take 1 tablet (80 mg) by mouth once daily., Disp: 100 tablet, Rfl: 3    cholecalciferol (Vitamin D-3) 5,000 Units tablet, Take 1 tablet (5,000 Units) by mouth once daily., Disp: , Rfl:     hydroCHLOROthiazide (HYDRODiuril) 25 mg tablet, Take 1 (ONE) tablet (25 mg) by mouth once daily, Disp: 90 tablet, Rfl: 1    pantoprazole (ProtoNix) 40 mg EC tablet, Take 1 tablet (40 mg) by mouth once daily., Disp: 90 tablet, Rfl: 1    telmisartan (MIcarDIS) 40 mg tablet, Take 1 tablet (40 mg) by mouth once daily., Disp: 30 tablet, Rfl: 11    albuterol 90 mcg/actuation inhaler, Inhale 2 puffs every 4 hours if needed for wheezing., Disp: 18 g, Rfl: 1    glycopyrrolate-formoteroL (Bevespi Aerosphere) 9-4.8 mcg HFA aerosol inhaler, Inhale 2 sprays 2 times a day., Disp: 10.7 g, Rfl: 11          Review of Systems     Vitals:    06/11/24 0812   BP: 157/78   Pulse: 62   Resp: 20   Temp: 36.1 °C (97 °F)   SpO2: 98%        Physical Exam

## 2024-06-11 NOTE — PATIENT INSTRUCTIONS
I have ordered complete PFTs and a 6-minute walk.  They can be scheduled by calling     I have ordered Bevespi to utilize 2 puffs twice daily; continue to use albuterol 2 puffs every 4-6 hours as needed.

## 2024-07-10 ENCOUNTER — APPOINTMENT (OUTPATIENT)
Dept: CARDIOLOGY | Facility: CLINIC | Age: 69
End: 2024-07-10
Payer: MEDICARE

## 2024-07-16 ENCOUNTER — APPOINTMENT (OUTPATIENT)
Dept: CARDIOLOGY | Facility: CLINIC | Age: 69
End: 2024-07-16
Payer: MEDICARE

## 2024-07-16 VITALS
BODY MASS INDEX: 28.16 KG/M2 | DIASTOLIC BLOOD PRESSURE: 74 MMHG | HEIGHT: 65 IN | HEART RATE: 79 BPM | WEIGHT: 169 LBS | SYSTOLIC BLOOD PRESSURE: 168 MMHG

## 2024-07-16 DIAGNOSIS — I25.10 CORONARY ARTERY CALCIFICATION: ICD-10-CM

## 2024-07-16 DIAGNOSIS — I25.10 ATHEROSCLEROSIS OF NATIVE CORONARY ARTERY OF NATIVE HEART, UNSPECIFIED WHETHER ANGINA PRESENT: ICD-10-CM

## 2024-07-16 DIAGNOSIS — I25.84 CORONARY ATHEROSCLEROSIS DUE TO CALCIFIED CORONARY LESION: ICD-10-CM

## 2024-07-16 DIAGNOSIS — I10 PRIMARY HYPERTENSION: ICD-10-CM

## 2024-07-16 DIAGNOSIS — I25.10 CORONARY ATHEROSCLEROSIS DUE TO CALCIFIED CORONARY LESION: ICD-10-CM

## 2024-07-16 DIAGNOSIS — E78.2 MIXED HYPERLIPIDEMIA: Primary | ICD-10-CM

## 2024-07-16 DIAGNOSIS — I25.84 CORONARY ARTERY CALCIFICATION: ICD-10-CM

## 2024-07-16 PROCEDURE — 3008F BODY MASS INDEX DOCD: CPT

## 2024-07-16 PROCEDURE — 1159F MED LIST DOCD IN RCRD: CPT

## 2024-07-16 PROCEDURE — 3077F SYST BP >= 140 MM HG: CPT

## 2024-07-16 PROCEDURE — 99215 OFFICE O/P EST HI 40 MIN: CPT

## 2024-07-16 PROCEDURE — 1036F TOBACCO NON-USER: CPT

## 2024-07-16 PROCEDURE — 3078F DIAST BP <80 MM HG: CPT

## 2024-07-16 PROCEDURE — 1160F RVW MEDS BY RX/DR IN RCRD: CPT

## 2024-07-16 RX ORDER — AMLODIPINE BESYLATE 5 MG/1
5 TABLET ORAL DAILY
Qty: 90 TABLET | Refills: 3 | Status: SHIPPED | OUTPATIENT
Start: 2024-07-16 | End: 2025-07-16

## 2024-07-16 NOTE — PATIENT INSTRUCTIONS
Hypertension  - Still not optimal  - Continue with Telmisartan 40mg - hydrochlorothiazide 25mg  - We will add amlodipine 5mg a day  - Low sodium diet    Monitor your blood pressure at home and keep a log of the readings    Steps to check your blood pressure at home    Be still> Don't smoke, drink caffeinated beverages or exercise within 30 minutes before measuring your blood pressure. Empty your bladder and ensure at least five minutes of quiet rest before measurements.   Sit correctly. Sit with your back straight and supported (on a dining chair, rather than a sofa). Your feet should be flat on the floor and your legs should not be crossed. Your arm should be supported on a flat surface, such as a table, with the upper arm at heart level. Make sure the bottom of the cuff is placed directly above the bend of the elbow. Check your monitor's instructions for an illustration or have your health care professional show you how.  Measure at the same time every day. It’s important to take the readings at the same time each day, such as morning and evening. It is best to take the readings daily, ideally beginning two weeks after a change in treatment and during the week before your next appointment.  Take multiple readings and record the results. Each time you measure, take two readings one minute apart and record the results using a printable (PDF) tracker. If your monitor has built-in memory to store your readings, take it with you to your appointments. Some monitors may also allow you to upload your readings to a secure website after you register your profile.  Don't take the measurement over clothes    You can review the information here  [email or web address]

## 2024-07-16 NOTE — PROGRESS NOTES
.fjlLocation of visit:  [address] [facility]   Type of Visit: Established - Last Seen: Dr. Rodriguez    Chief Complaint:  Dyspnea with exertion    History Of Present Illness:    Sharon Caraballo is a 68 y.o. female, with history significant for high coronary calcium score, who visits Cardiology today as a follow up visit  for Dyspnea with exertion.    Previous information   Ms. Caraballo is a 66-year-old female with past medical history significant for hypertension, dyslipidemia, COPD referred to our cardiology clinic after a CT scan of the chest showed severe calcification of her coronary arteries.  Patient reports having shortness of breath on exertion for the last 2 years. She reports that her shortness of breath and chest pain has been slowly progressing in the last few months. She now gets chest pain and dyspnea after walking with her dog at a distance of 3-4 blocks. She was referred for LHC and Echocardiogram. TTE showed normal EF without any significant valvular dysfunction. LHC showed normal resting LVEDP and mild-mod non obstructive CAD.   Patient had PFTs showed mod obstruction. Patient denies any orthopnea, PND's, lower extremity swelling, dizziness, palpitation, syncope or presyncope.  Patient appears to be heavy smoker, but stopped 10 years ago.   Patient was recently started on atorvastatin 40 for her dyslipidemia, Aspirin.   Patient reports compliance with her blood pressure medication and she reports that she usually check her blood pressure at home and is usually around 120-140. However she reports missing few evening doses of her Carvedilol.   Review of systems negative except above.    Social history: Former heavy smoker.  Family history: Mother has a recent stroke     66-year-old female with past medical history significant for hypertension, dyslipidemia, COPD referred to our cardiology clinic after a CT scan of the chest showed severe calcification of her coronary arteries.  Patient reported exertional  dyspnea +- angina. She was referred for LHC and Echocardiogram. TTE showed normal EF without any significant valvular dysfunction. LHC showed normal resting LVEDP and mild-mod non obstructive CAD.   Patient had PFTs showed mod obstruction.       # Exertional dyspnea  - Symptoms appear more related to COPD/Asthma rather than obstructive coronary disease or HFpEF.   - Referral to pulmonary medicine was made.   - Patient needs aggressive risk factor modifications.   -Continue aspirin 81 mg daily and atorvastatin 40 mg daily.  -Repeat Lipid profile today. LDL target should be below 70.      ===========================  Previous visit: She reports that her shortness of breath has been stable but it hasn't improve. She keeps havign dyspnea after walking at a distance of 3-4 blocks. No other relevant cardiovascular symptoms.  She hasn't been complying with her medication  Blood pressure today: 168/76 mmHg  ===========================    This visit> 07-16-24  She has been feeling better. Losing some weight. Walking. A little less shortness of breath  She haven't been able to buy the inhaler Dr. Sue prescribed for economic issues  She says her BP still elevated at home - less than before      Nuclear stress test  04-25-24: 1. Negative myocardial perfusion study without evidence of inducible myocardial ischemia or prior infarction.   2. The left ventricle is normal in size.  3. Normal LV wall motion with a post-stress LV EF estimated at 65%.     TTE  04-10-24:  1. Left ventricular systolic function is normal with a 60-65% estimated ejection fraction.  2. RVSP within normal limits.    Past Medical History:  She has a past medical history of Personal history of other diseases of the nervous system and sense organs (05/04/2021) and Shortness of breath (03/19/2021).    Past Surgical History:  She has a past surgical history that includes Other surgical history (05/13/2022).    Social History:  She reports that she has never  "smoked. She has never used smokeless tobacco. She reports current alcohol use. She reports that she does not currently use drugs.    Family History:  No family history on file.  Allergies:  Patient has no known allergies.    Outpatient Medications:  Current Outpatient Medications   Medication Instructions    albuterol 90 mcg/actuation inhaler 2 puffs, inhalation, Every 4 hours PRN    aspirin 81 mg, oral, Daily    atorvastatin (LIPITOR) 80 mg, oral, Daily    cholecalciferol (Vitamin D-3) 5,000 Units tablet 1 tablet, oral, Daily    glycopyrrolate-formoteroL (Bevespi Aerosphere) 9-4.8 mcg HFA aerosol inhaler 2 sprays, inhalation, 2 times daily    hydroCHLOROthiazide (HYDRODiuril) 25 mg tablet Take 1 (ONE) tablet (25 mg) by mouth once daily    pantoprazole (PROTONIX) 40 mg, oral, Daily    telmisartan (MICARDIS) 40 mg, oral, Daily     Last Recorded Vitals:  There were no vitals filed for this visit.  Physical Exam:      6/11/2024     8:12 AM 4/25/2024     8:49 AM 4/10/2024    10:24 AM 11/6/2023    12:36 PM 7/31/2023     1:39 PM 5/10/2023     4:55 PM   Vitals   Systolic 157 168 168 118 147 132   Diastolic 78 76 76 78 82 78   Heart Rate 62   65 78 72   Temp 36.1 °C (97 °F)   36.4 °C (97.5 °F)  36.2 °C (97.2 °F)   Resp 20    17    Height (in)  1.6 m (5' 3\") 1.6 m (5' 3\") 1.626 m (5' 4\") 1.6 m (5' 3\") 1.626 m (5' 4\")   Weight (lb) 170.8 170 170 171.2 170.38 176.6   BMI 30.26 kg/m2 30.11 kg/m2 30.11 kg/m2 29.39 kg/m2 30.18 kg/m2 30.31 kg/m2   BSA (m2) 1.86 m2 1.85 m2 1.85 m2 1.87 m2 1.85 m2 1.9 m2   Visit Report Report  Report Report  Report     Wt Readings from Last 5 Encounters:   06/11/24 77.5 kg (170 lb 12.8 oz)   04/25/24 77.1 kg (170 lb)   04/10/24 77.1 kg (170 lb)   11/06/23 77.7 kg (171 lb 3.2 oz)   07/31/23 77.3 kg (170 lb 6 oz)     General: Sitting up comfortably in chair; in no apparent distress.  HEENT: Normocephalic; atraumatic. Well hydrated.  Eyes: Anicteric sclera. Extraocular movement intact.  Neck: Supple; " "no thyromegaly; normal jugular venous pressure, no bruits.  Respiratory: Bilateral air entry equal. No wheezing.  Cardiovascular: Normal S1, S2; no murmurs auscultated.  Abdomen: Nondistended; nontender. (+) bowel sounds.  Extremities: No peripheral edema present. Pulses 2+ diffusely.  Neurological: Oriented to time, place, and person; nonfocal.  Psychiatric: Normal affect.     Last Labs Reviewed:  CBC -  Recent Labs     02/23/22  1159 04/14/21  1204   WBC 4.4 3.9*   HGB 14.7 13.8   HCT 43.8 41.7    285   MCV 90 90     CMP -  Recent Labs     11/17/23  1033 01/20/23  1302 05/13/22  1056 02/23/22  1159 10/15/21  1413    143 140 139 144   K 4.0 4.2 5.0 4.2 4.5    105 103 100 106   CO2 28 33* 30 31 30   ANIONGAP 14 9* 12 12 13   BUN 11 15 11 16 13   CREATININE 0.87 0.97 0.84 0.93 0.99   EGFR 74  --   --   --   --    CALCIUM 9.4 10.1 10.0 10.5 10.2     Recent Labs     11/17/23  1033 05/13/22  1056 02/23/22  1159 04/14/21  1204   ALBUMIN 4.5 4.5 5.0 4.7   ALKPHOS 57 68 67 70   ALT 11 14 21 15   AST 14 17 19 16   BILITOT 0.4 0.4 0.6 0.6     LIPID PANEL -   Recent Labs     01/20/23  1302 05/13/22  1056 04/20/22  1122   CHOL 247* 157 168   LDLF 146* 85 81   HDL 83.1 58.1 68.1   TRIG 91 69 97     COAGULATION PANEL -  Recent Labs     02/23/22  1159   INR 1.0     HEME/ENDO -  Recent Labs     11/17/23  1033 01/20/23  1302 02/23/22  1159 04/14/21  1204   TSH 0.23*  --  0.31* 0.17*   HGBA1C 5.6 5.9* 6.0*  --    FREET4 1.40  --  1.46 1.39     CARDIOVASCULAR  No results for input(s): \"LDH\", \"CKMB\", \"TROPHS\", \"BNP\", \"CRP\" in the last 88789 hours.    No lab exists for component: \"CK\", \"CKMBP\", \"CRPUS\", \"USCRP\"  Last Cardiology/Imaging Tests Personally Reviewed (if images available) and Interpreted:  ECG:  No results found for this or any previous visit (from the past 4464 hour(s)).  ECG 12 lead 11/06/2023    Echocardiogram:  ECHOCARDIOGRAM     Narrative  Ordered by an unspecified provider.  No results found for this " or any previous visit from the past 1095 days.    Cath:  Adult Cath     Narrative  [facility], Cath Lab, [address], [place] [address]    Cardiovascular Catheterization Report    Patient Name:     STEWART CLAY Performing Physician:  [id number] Martin Martins MD  Study Date:       3/17/2022    Verifying Physician:   [id number] Martin Martins MD  MRN/PID:          [id number]     Cardiologist/Co-scrub:  Accession/Order#: [id number]    Fellow:                Jaspreet Hinton MD  YOB: 1955   Fellow:  Gender:           F            Referring Physician:   MARTIN MARTINS  Admit Date:                    Referring Physician:   x  Surgeon:                       Referring Physician:   [id number] Gina Mueller MD      Study: Left Heart Catheterization      Indications:  STEWART CLAY is a 66 year old female who presents with dyslipidemia and hypertension. New onset angina <=2 months, with a chest pain assessment of atypical angina. Study performed as an elective cath procedure.    Appropriate Use Criteria:  Unstable angina with intermediate risk score; AUC score = 8.    Medical History:  Stress test performed: No. CTA performed: No. [place] accessed: Yes. LVEF Assessed: Yes. LVEF = 60%.    Procedure Description Comments:  After informed consent was obtained, patient brought to the cardiac catheterization laboratory., Was performed. The right wrist was prepped and draped in usual fashion. 2% Xylocaine was after locally. A Seldinger technique was used to introduce 6 [place]ian sheath in the right radial artery. A 5 [place]ian [place] catheter was advanced into the ascending aorta. Selective coronary angiography was performed. This is then advanced over a J-wire into the left ventricle. Left ventricular end-diastolic pressure was obtained. A TR band was used to achieve hemostasis of the right radial artery. The patient tolerated the procedure without difficulty.    Patient received intra-arterial nitroglycerin. She  received intravenous heparin.    Coronary Angiography:  The coronary circulation is right dominant.    Coronary Angiography Comments:  Left main coronary artery contains mild luminal irregularities.    Left anterior descending artery contains a 30% ostial stenosis. First and second diagonal arteries are diminutive with mild luminal irregularities.    Left circumflex artery contains mild disease proximally. The AV groove circumflex in the midportion contains a 50% stenosis, although in WALTER caudal view does appear more stenotic. This is likely a vessel overlap from foreshortening. The first obtuse marginal artery contains mild luminal irregularities. Second obtuse marginal artery contains mild luminal irregularities.    Right coronary artery contains a 30 to 40% long stenosis in its midportion. Posterior descending artery posterolateral branches contain mild luminal irregularities.    Complications:  No in-lab complications observed.    Cardiac Cath Transition of Care Summary:  Post Procedure Diagnosis: Cad.  Blood Loss:               Estimated blood loss during the procedure was < 20  mls.  Specimens Removed:        Number of specimen(s) removed: none.      Recommendations:  Maximize medical therapy.  Lipid lowering agent or Statin therapy.  Further recommendations per primary team.    ____________________________________________________________________________________  CONCLUSIONS:  1. Mild to moderate, nonobstructive coronary artery disease.  2. Normal left heart filling pressures.    ____________________________________________________________________________________    Stress Test:  No results found for this or any previous visit from the past 1825 days.  No results found for this or any previous visit from the past 1095 days.    Cardiac CT/MRI:  No results found for this or any previous visit from the past 1825 days.  No results found for this or any previous visit from the past 1095 days.    Other CT:      CV  RISK FACTORS:   # Hypertension: Last BP:  .  # Hyperlipidemia: Last Tchol No results found for requested labs within last 365 days. / LDL No results found for requested labs within last 365 days. / HDL No results found for requested labs within last 365 days. / TRIG No results found for requested labs within last 365 days. (No results in last year.).  # Type II Diabetes Mellitus: Last A1c 5.6 (11/17/2023: 10:33 AM).  # Obesity: Last BMI:  .  # CKD: Last BUN/Cr (GFR): 11/0.87 (74), 11/17/2023: 10:33 AM.    ASCV RISK:  The 10-year ASCVD risk score (Lalitha REEVES, et al., 2019) is: 17%    Values used to calculate the score:      Age: 68 years      Sex: Female      Is Non- : Yes      Diabetic: No      Tobacco smoker: No      Systolic Blood Pressure: 157 mmHg      Is BP treated: Yes      HDL Cholesterol: 83.1 mg/dL      Total Cholesterol: 247 mg/dL    Assessment/Plan     68-year-old female with past medical history significant for hypertension, dyslipidemia, COPD and CT scan of the chest that showed severe calcification of her coronary arteries.  Patient reported exertional dyspnea +- angina.   On 2022 she was referred for LHC and Echocardiogram. TTE showed normal EF without any significant valvular dysfunction. LHC showed normal resting LVEDP and mild-mod non obstructive CAD.   Patient had PFTs showed mod obstruction.       #Exertional dyspnea - Improving  - Symptoms appear more related to COPD/Asthma rather than obstructive coronary disease or HFpEF.   - TTE normal EF and nuclear stress test negative for ischemia    #Hypertension  -Still not optimal  - Continue with Telmisartan 40mg - hydrochlorothiazide 25mg  - We will add amlodipine 5mg a day  - Low sodium diet    #Hyperlipidemia  -Continue aspirin 81 mg daily and atorvastatin 80 mg daily.  -Repeat Lipid profile. LDL target should be below 70.       - Patient will follow up with me in the Cardiology office once the results are available  - I spent  45 minutes assessing the case between pre-charting, face-to-face patient interaction, and documentation    Miguel Benavidez MD

## 2024-07-18 ENCOUNTER — APPOINTMENT (OUTPATIENT)
Dept: RESPIRATORY THERAPY | Facility: HOSPITAL | Age: 69
End: 2024-07-18
Payer: MEDICARE

## 2024-07-18 ENCOUNTER — HOSPITAL ENCOUNTER (OUTPATIENT)
Dept: RESPIRATORY THERAPY | Facility: HOSPITAL | Age: 69
Discharge: HOME | End: 2024-07-18
Payer: MEDICARE

## 2024-07-18 DIAGNOSIS — R06.09 DOE (DYSPNEA ON EXERTION): ICD-10-CM

## 2024-07-18 PROCEDURE — 94618 PULMONARY STRESS TESTING: CPT | Performed by: INTERNAL MEDICINE

## 2024-07-18 PROCEDURE — 94726 PLETHYSMOGRAPHY LUNG VOLUMES: CPT | Performed by: INTERNAL MEDICINE

## 2024-07-18 PROCEDURE — 94729 DIFFUSING CAPACITY: CPT | Performed by: INTERNAL MEDICINE

## 2024-07-18 PROCEDURE — 94726 PLETHYSMOGRAPHY LUNG VOLUMES: CPT

## 2024-07-18 PROCEDURE — 94060 EVALUATION OF WHEEZING: CPT | Performed by: INTERNAL MEDICINE

## 2024-07-23 DIAGNOSIS — J44.9 CHRONIC OBSTRUCTIVE PULMONARY DISEASE, UNSPECIFIED COPD TYPE (MULTI): Primary | ICD-10-CM

## 2024-07-23 DIAGNOSIS — J43.9 PULMONARY EMPHYSEMA, UNSPECIFIED EMPHYSEMA TYPE (MULTI): ICD-10-CM

## 2024-07-23 RX ORDER — ALBUTEROL SULFATE 90 UG/1
2 AEROSOL, METERED RESPIRATORY (INHALATION) EVERY 4 HOURS PRN
Qty: 18 G | Refills: 1 | Status: SHIPPED | OUTPATIENT
Start: 2024-07-23 | End: 2024-08-22

## 2024-07-23 NOTE — PROGRESS NOTES
PFTs reveal moderate airflow obstruction with a bronchodilator response.  She cannot afford the controller medications.  Will renew the albuterol.   Hopefully cheaper meds available later in the year

## 2024-08-30 ENCOUNTER — HOSPITAL ENCOUNTER (OUTPATIENT)
Dept: RADIOLOGY | Facility: CLINIC | Age: 69
Discharge: HOME | End: 2024-08-30
Payer: MEDICARE

## 2024-08-30 VITALS — WEIGHT: 164 LBS | BODY MASS INDEX: 28 KG/M2 | HEIGHT: 64 IN

## 2024-08-30 DIAGNOSIS — Z12.31 SCREENING MAMMOGRAM FOR BREAST CANCER: ICD-10-CM

## 2024-08-30 PROCEDURE — 77067 SCR MAMMO BI INCL CAD: CPT

## 2024-09-30 DIAGNOSIS — K21.9 GASTROESOPHAGEAL REFLUX DISEASE WITHOUT ESOPHAGITIS: ICD-10-CM

## 2024-09-30 DIAGNOSIS — I10 PRIMARY HYPERTENSION: ICD-10-CM

## 2024-09-30 RX ORDER — PANTOPRAZOLE SODIUM 40 MG/1
40 TABLET, DELAYED RELEASE ORAL DAILY
Qty: 90 TABLET | Refills: 0 | Status: SHIPPED | OUTPATIENT
Start: 2024-09-30 | End: 2025-03-29

## 2024-09-30 RX ORDER — HYDROCHLOROTHIAZIDE 25 MG/1
TABLET ORAL
Qty: 90 TABLET | Refills: 0 | Status: SHIPPED | OUTPATIENT
Start: 2024-09-30

## 2024-11-06 ENCOUNTER — APPOINTMENT (OUTPATIENT)
Dept: CARDIOLOGY | Facility: CLINIC | Age: 69
End: 2024-11-06
Payer: MEDICARE

## 2024-11-06 VITALS
SYSTOLIC BLOOD PRESSURE: 122 MMHG | DIASTOLIC BLOOD PRESSURE: 73 MMHG | HEIGHT: 64 IN | HEART RATE: 63 BPM | BODY MASS INDEX: 28 KG/M2 | WEIGHT: 164 LBS

## 2024-11-06 DIAGNOSIS — I25.10 ATHEROSCLEROSIS OF NATIVE CORONARY ARTERY OF NATIVE HEART, UNSPECIFIED WHETHER ANGINA PRESENT: ICD-10-CM

## 2024-11-06 DIAGNOSIS — I25.10 CORONARY ARTERY CALCIFICATION: ICD-10-CM

## 2024-11-06 DIAGNOSIS — I25.10 CORONARY ATHEROSCLEROSIS DUE TO CALCIFIED CORONARY LESION: ICD-10-CM

## 2024-11-06 DIAGNOSIS — I10 PRIMARY HYPERTENSION: ICD-10-CM

## 2024-11-06 DIAGNOSIS — I25.84 CORONARY ATHEROSCLEROSIS DUE TO CALCIFIED CORONARY LESION: ICD-10-CM

## 2024-11-06 DIAGNOSIS — E78.2 MIXED HYPERLIPIDEMIA: Primary | ICD-10-CM

## 2024-11-06 PROCEDURE — 3078F DIAST BP <80 MM HG: CPT

## 2024-11-06 PROCEDURE — 99215 OFFICE O/P EST HI 40 MIN: CPT

## 2024-11-06 PROCEDURE — 1159F MED LIST DOCD IN RCRD: CPT

## 2024-11-06 PROCEDURE — G2211 COMPLEX E/M VISIT ADD ON: HCPCS

## 2024-11-06 PROCEDURE — 1123F ACP DISCUSS/DSCN MKR DOCD: CPT

## 2024-11-06 PROCEDURE — 1036F TOBACCO NON-USER: CPT

## 2024-11-06 PROCEDURE — 1160F RVW MEDS BY RX/DR IN RCRD: CPT

## 2024-11-06 PROCEDURE — 3008F BODY MASS INDEX DOCD: CPT

## 2024-11-06 PROCEDURE — 3074F SYST BP LT 130 MM HG: CPT

## 2024-11-06 NOTE — PATIENT INSTRUCTIONS
Today we reviewed the following issues:  -Shortness of Breath with Activity: Your breathing has improved. It seems more related to your COPD/Asthma rather than heart issues or blocked arteries.    -Coronary Artery Disease (CAD): Your heart function looks good, with normal test results showing no signs of blockage. Keep taking your aspirin (81 mg) and atorvastatin (80 mg) daily to protect your heart. We’ll also check your cholesterol levels to make sure they stay on track, and I encourage you to continue your physical activity as it’s very beneficial.    -High Blood Pressure: Your blood pressure is well-controlled with your current medications (Telmisartan 40 mg and hydrochlorothiazide 25 mg). Keep following a low-sodium diet to help maintain this control.    -High Cholesterol: Continue with your current medications (aspirin and atorvastatin). We’ll check your cholesterol levels again to ensure your LDL stays below 70, which is our target.    Next Steps:  -Continue all current medications as directed.  -Follow a low-sodium diet and keep active.  -Check a lipid panel  -We’ll see each other for a follow-up in 6 months to review your progress.    Thank you for your dedication to your health!

## 2024-11-06 NOTE — PROGRESS NOTES
.fjlLocation of visit[place]  [facility]   Type of Visit: Established - Last Seen: Dr. Rodriguez    Chief Complaint:  Dyspnea with exertion    History Of Present Illness:    Sharon Caraballo is a 69 y.o. female, with history significant for high coronary calcium score, who visits Cardiology today as a follow up visit  for Dyspnea with exertion.    Ms. Caraballo is a 69-year-old female with a history of hypertension, dyslipidemia, COPD, and high coronary calcium score, presenting for follow-up evaluation of exertional dyspnea, which she reports has slightly improved with weight loss and increased activity, though she continues to experience shortness of breath after walking 3-4 blocks. Her previous workup includes a normal echocardiogram (EF 60-65%), negative nuclear stress test, and left heart catheterization showing mild to moderate non-obstructive CAD with normal resting LVEDP. PFTs showed moderate obstruction, likely contributing to her symptoms. She remains on aspirin 81 mg and atorvastatin 40 mg, though financial constraints have limited her ability to purchase her prescribed inhaler.   At last visit stated that her blood pressure remains elevated at home despite reported compliance  ---------------------------  At this visit, she is doing well and feels better overall. She is in good spirits and is exercising three times a week without issues. She reports no longer experiencing shortness of breath. Her blood pressure is well-controlled with her current medications. She is only using inhalers as needed. She has not had any chest pain, palpitations, lightheadedness, or syncope. There is no extremity edema, no paroxysmal nocturnal dyspnea, and no orthopnea.    Past Medical History:  She has a past medical history of Personal history of other diseases of the nervous system and sense organs (05/04/2021) and Shortness of breath (03/19/2021).    Past Surgical History:  She has a past surgical history that includes Other surgical  "history (05/13/2022).    Social History:  She reports that she has never smoked. She has never used smokeless tobacco. She reports current alcohol use. She reports that she does not currently use drugs.    Family History:  Family History   Problem Relation Name Age of Onset    Ovarian cancer Sister      Breast cancer Sister      Breast cancer Sister       Allergies:  Patient has no known allergies.    Outpatient Medications:  Current Outpatient Medications   Medication Instructions    albuterol 90 mcg/actuation inhaler 2 puffs, inhalation, Every 4 hours PRN    amLODIPine (NORVASC) 5 mg, oral, Daily    aspirin 81 mg, oral, Daily    atorvastatin (LIPITOR) 80 mg, oral, Daily    cholecalciferol (Vitamin D-3) 5,000 Units tablet 1 tablet, oral, Daily    glycopyrrolate-formoteroL (Bevespi Aerosphere) 9-4.8 mcg HFA aerosol inhaler 2 sprays, inhalation, 2 times daily    hydroCHLOROthiazide (HYDRODiuril) 25 mg tablet Take 1 (ONE) tablet (25 mg) by mouth once daily    pantoprazole (PROTONIX) 40 mg, oral, Daily    telmisartan (MICARDIS) 40 mg, oral, Daily     Last Recorded Vitals:  There were no vitals filed for this visit.  Physical Exam:      8/30/2024     1:55 PM 7/16/2024     1:04 PM 6/11/2024     8:12 AM 4/25/2024     8:49 AM 4/10/2024    10:24 AM 11/6/2023    12:36 PM   Vitals   Systolic  168 157 168 168 118   Diastolic  74 78 76 76 78   Heart Rate  79 62   65   Temp   36.1 °C (97 °F)   36.4 °C (97.5 °F)   Resp   20      Height (in) 1.626 m (5' 4\") 1.651 m (5' 5\")  1.6 m (5' 3\") 1.6 m (5' 3\") 1.626 m (5' 4\")   Weight (lb) 164 169 170.8 170 170 171.2   BMI 28.15 kg/m2 28.12 kg/m2 30.26 kg/m2 30.11 kg/m2 30.11 kg/m2 29.39 kg/m2   BSA (m2) 1.83 m2 1.88 m2 1.86 m2 1.85 m2 1.85 m2 1.87 m2   Visit Report  Report Report  Report Report     Wt Readings from Last 5 Encounters:   08/30/24 74.4 kg (164 lb)   07/16/24 76.7 kg (169 lb)   06/11/24 77.5 kg (170 lb 12.8 oz)   04/25/24 77.1 kg (170 lb)   04/10/24 77.1 kg (170 lb) " "    General: Sitting up comfortably in chair; in no apparent distress.  HEENT: Normocephalic; atraumatic. Well hydrated.  Eyes: Anicteric sclera. Extraocular movement intact.  Neck: Supple; no thyromegaly; normal jugular venous pressure, no bruits.  Respiratory: Bilateral air entry equal. No wheezing.  Cardiovascular: Normal S1, S2; no murmurs auscultated.  Abdomen: Nondistended; nontender. (+) bowel sounds.  Extremities: No peripheral edema present. Pulses 2+ diffusely.  Neurological: Oriented to time, place, and person; nonfocal.  Psychiatric: Normal affect.     Last Labs Reviewed:  CBC -  Recent Labs     02/23/22  1159 04/14/21  1204   WBC 4.4 3.9*   HGB 14.7 13.8   HCT 43.8 41.7    285   MCV 90 90     CMP -  Recent Labs     11/17/23  1033 01/20/23  1302 05/13/22  1056 02/23/22  1159 10/15/21  1413    143 140 139 144   K 4.0 4.2 5.0 4.2 4.5    105 103 100 106   CO2 28 33* 30 31 30   ANIONGAP 14 9* 12 12 13   BUN 11 15 11 16 13   CREATININE 0.87 0.97 0.84 0.93 0.99   EGFR 74  --   --   --   --    CALCIUM 9.4 10.1 10.0 10.5 10.2     Recent Labs     11/17/23  1033 05/13/22  1056 02/23/22  1159 04/14/21  1204   ALBUMIN 4.5 4.5 5.0 4.7   ALKPHOS 57 68 67 70   ALT 11 14 21 15   AST 14 17 19 16   BILITOT 0.4 0.4 0.6 0.6     LIPID PANEL -   Recent Labs     01/20/23  1302 05/13/22  1056 04/20/22  1122   CHOL 247* 157 168   LDLF 146* 85 81   HDL 83.1 58.1 68.1   TRIG 91 69 97     COAGULATION PANEL -  Recent Labs     02/23/22  1159   INR 1.0     HEME/ENDO -  Recent Labs     11/17/23  1033 01/20/23  1302 02/23/22  1159 04/14/21  1204   TSH 0.23*  --  0.31* 0.17*   HGBA1C 5.6 5.9* 6.0*  --    FREET4 1.40  --  1.46 1.39     CARDIOVASCULAR  No results for input(s): \"LDH\", \"CKMB\", \"TROPHS\", \"BNP\", \"CRP\" in the last 94881 hours.    No lab exists for component: \"CK\", \"CKMBP\", \"CRPUS\", \"USCRP\"  Last Cardiology/Imaging Tests Personally Reviewed (if images available) and Interpreted:  ECG:  No results found for " this or any previous visit (from the past 4464 hours).  ECG 12 lead 11/06/2023    Echocardiogram:  ECHOCARDIOGRAM     Narrative  Ordered by an unspecified provider.  No results found for this or any previous visit from the past 1095 days.    Cath:  Adult Cath     Narrative  [facility], Cath Lab, [address] [place] [id number]    Cardiovascular Catheterization Report    Patient Name:     STEWART CLAY Performing Physician:  [id number] Martin Martins MD  Study Date:       3/17/2022    Verifying Physician:   [id number] Martin Martins MD  MRN/PID:          [id number]     Cardiologist/Co-scrub:  Accession/Order#: [id number]    Fellow:                Jaspreet Hinton MD  YOB: 1955   Fellow:  Gender:           F            Referring Physician:   MARTIN MARTINS  Admit Date:                    Referring Physician:   gus  Surgeon:                       Referring Physician:   [id number] Gina Mueller MD      Study: Left Heart Catheterization      Indications:  STEWART CLAY is a 66 year old female who presents with dyslipidemia and hypertension. New onset angina <=2 months, with a chest pain assessment of atypical angina. Study performed as an elective cath procedure.    Appropriate Use Criteria:  Unstable angina with intermediate risk score; AUC score = 8.    Medical History:  Stress test performed: No. CTA performed: No. Agatston accessed: Yes. LVEF Assessed: Yes. LVEF = 60%.    Procedure Description Comments:  After informed consent was obtained, patient brought to the cardiac catheterization laboratory., Was performed. The right wrist was prepped and draped in usual fashion. 2% Xylocaine was after locally. A Seldinger technique was used to introduce 6 Mongolian sheath in the right radial artery. A 5 Mongolian Wartburg catheter was advanced into the ascending aorta. Selective coronary angiography was performed. This is then advanced over a J-wire into the left ventricle. Left ventricular end-diastolic pressure was  obtained. A TR band was used to achieve hemostasis of the right radial artery. The patient tolerated the procedure without difficulty.    Patient received intra-arterial nitroglycerin. She received intravenous heparin.    Coronary Angiography:  The coronary circulation is right dominant.    Coronary Angiography Comments:  Left main coronary artery contains mild luminal irregularities.    Left anterior descending artery contains a 30% ostial stenosis. First and second diagonal arteries are diminutive with mild luminal irregularities.    Left circumflex artery contains mild disease proximally. The AV groove circumflex in the midportion contains a 50% stenosis, although in WALTER caudal view does appear more stenotic. This is likely a vessel overlap from foreshortening. The first obtuse marginal artery contains mild luminal irregularities. Second obtuse marginal artery contains mild luminal irregularities.    Right coronary artery contains a 30 to 40% long stenosis in its midportion. Posterior descending artery posterolateral branches contain mild luminal irregularities.    Complications:  No in-lab complications observed.    Cardiac Cath Transition of Care Summary:  Post Procedure Diagnosis: Cad.  Blood Loss:               Estimated blood loss during the procedure was < 20  mls.  Specimens Removed:        Number of specimen(s) removed: none.      Recommendations:  Maximize medical therapy.  Lipid lowering agent or Statin therapy.  Further recommendations per primary team.    ____________________________________________________________________________________  CONCLUSIONS:  1. Mild to moderate, nonobstructive coronary artery disease.  2. Normal left heart filling pressures.    ____________________________________________________________________________________    Stress Test:  No results found for this or any previous visit from the past 1825 days.  No results found for this or any previous visit from the past 1095  days.    Cardiac CT/MRI:  No results found for this or any previous visit from the past 1825 days.  No results found for this or any previous visit from the past 1095 days.    Other CT:      CV RISK FACTORS:   # Hypertension: Last BP:  .  # Hyperlipidemia: Last Tchol No results found for requested labs within last 365 days. / LDL No results found for requested labs within last 365 days. / HDL No results found for requested labs within last 365 days. / TRIG No results found for requested labs within last 365 days. (No results in last year.).  # Type II Diabetes Mellitus: Last A1c 5.6 (11/17/2023: 10:33 AM).  # Obesity: Last BMI:  .  # CKD: Last BUN/Cr (GFR): 11/0.87 (74), 11/17/2023: 10:33 AM.    ASCV RISK:  The 10-year ASCVD risk score (Lalitha REEVES, et al., 2019) is: 20.7%    Values used to calculate the score:      Age: 69 years      Sex: Female      Is Non- : Yes      Diabetic: No      Tobacco smoker: No      Systolic Blood Pressure: 168 mmHg      Is BP treated: Yes      HDL Cholesterol: 83.1 mg/dL      Total Cholesterol: 247 mg/dL    Assessment/Plan   Sharon Caraballo is a 69 y.o. female, with history significant for high coronary calcium score, who visits Cardiology today as a follow up visit CAD.  68-year-old female with past medical history significant for hypertension, dyslipidemia, COPD, CAD and CT scan of the chest that showed severe calcification of her coronary arteries. On 2022 she was referred for LHC and Echocardiogram. TTE showed normal EF without any significant valvular dysfunction. LHC showed normal resting LVEDP and mild-mod non obstructive CAD. Patient had PFTs showed mod obstruction.       #Exertional dyspnea - Improved  - Symptoms appear more related to COPD/Asthma rather than obstructive coronary disease or HFpEF.    #CAD  - TTE normal EF and nuclear stress test negative for ischemia  - Continue with aspirin 81mg once a day  - Atorvastatin 80mg once a day  - We will check a  Lipid panel  - Continue with physiscal activity    #Hypertension  -Optimal at this visit -   - Continue with Telmisartan 40mg - hydrochlorothiazide 25mg  - Low sodium diet    #Hyperlipidemia  -Continue aspirin 81 mg daily and atorvastatin 80 mg daily.  -Repeat Lipid profile. LDL target should be below 70.     - Patient will follow up with me in the Cardiology office in 6 months  - I spent 40 minutes assessing the case between pre-charting, face-to-face patient interaction, and documentation    Miguel Benavidez MD

## 2024-11-07 ENCOUNTER — HOSPITAL ENCOUNTER (OUTPATIENT)
Dept: RADIOLOGY | Facility: HOSPITAL | Age: 69
Discharge: HOME | End: 2024-11-07
Payer: MEDICARE

## 2024-11-07 ENCOUNTER — HOSPITAL ENCOUNTER (OUTPATIENT)
Dept: RADIOLOGY | Facility: CLINIC | Age: 69
Discharge: HOME | End: 2024-11-07
Payer: MEDICARE

## 2024-11-07 DIAGNOSIS — F17.210 NICOTINE DEPENDENCE, CIGARETTES, UNCOMPLICATED: ICD-10-CM

## 2024-11-07 PROCEDURE — 71271 CT THORAX LUNG CANCER SCR C-: CPT

## 2024-11-11 ENCOUNTER — APPOINTMENT (OUTPATIENT)
Dept: PRIMARY CARE | Facility: CLINIC | Age: 69
End: 2024-11-11
Payer: MEDICARE

## 2024-11-11 VITALS
OXYGEN SATURATION: 98 % | TEMPERATURE: 97.1 F | DIASTOLIC BLOOD PRESSURE: 78 MMHG | BODY MASS INDEX: 28.79 KG/M2 | SYSTOLIC BLOOD PRESSURE: 116 MMHG | WEIGHT: 168.6 LBS | HEART RATE: 78 BPM | HEIGHT: 64 IN

## 2024-11-11 DIAGNOSIS — J43.9 PULMONARY EMPHYSEMA, UNSPECIFIED EMPHYSEMA TYPE (MULTI): ICD-10-CM

## 2024-11-11 DIAGNOSIS — E04.0 GOITER DIFFUSE, NONTOXIC: ICD-10-CM

## 2024-11-11 DIAGNOSIS — Z13.6 SCREENING FOR CARDIOVASCULAR CONDITION: ICD-10-CM

## 2024-11-11 DIAGNOSIS — R73.09 ABNORMAL GLUCOSE: ICD-10-CM

## 2024-11-11 DIAGNOSIS — Z71.89 CARDIAC RISK COUNSELING: ICD-10-CM

## 2024-11-11 DIAGNOSIS — Z13.1 DIABETES MELLITUS SCREENING: ICD-10-CM

## 2024-11-11 DIAGNOSIS — Z23 ENCOUNTER FOR IMMUNIZATION: ICD-10-CM

## 2024-11-11 DIAGNOSIS — Z00.00 ROUTINE GENERAL MEDICAL EXAMINATION AT A HEALTH CARE FACILITY: ICD-10-CM

## 2024-11-11 DIAGNOSIS — Z00.00 ROUTINE GENERAL MEDICAL EXAMINATION AT HEALTH CARE FACILITY: Primary | ICD-10-CM

## 2024-11-11 DIAGNOSIS — Z87.891 FORMER SMOKER: ICD-10-CM

## 2024-11-11 PROCEDURE — 1123F ACP DISCUSS/DSCN MKR DOCD: CPT | Performed by: NURSE PRACTITIONER

## 2024-11-11 PROCEDURE — 3008F BODY MASS INDEX DOCD: CPT | Performed by: NURSE PRACTITIONER

## 2024-11-11 PROCEDURE — 91320 SARSCV2 VAC 30MCG TRS-SUC IM: CPT | Performed by: NURSE PRACTITIONER

## 2024-11-11 PROCEDURE — 1170F FXNL STATUS ASSESSED: CPT | Performed by: NURSE PRACTITIONER

## 2024-11-11 PROCEDURE — G0439 PPPS, SUBSEQ VISIT: HCPCS | Performed by: NURSE PRACTITIONER

## 2024-11-11 PROCEDURE — 90480 ADMN SARSCOV2 VAC 1/ONLY CMP: CPT | Performed by: NURSE PRACTITIONER

## 2024-11-11 PROCEDURE — 1160F RVW MEDS BY RX/DR IN RCRD: CPT | Performed by: NURSE PRACTITIONER

## 2024-11-11 PROCEDURE — 3074F SYST BP LT 130 MM HG: CPT | Performed by: NURSE PRACTITIONER

## 2024-11-11 PROCEDURE — 3078F DIAST BP <80 MM HG: CPT | Performed by: NURSE PRACTITIONER

## 2024-11-11 PROCEDURE — 1126F AMNT PAIN NOTED NONE PRSNT: CPT | Performed by: NURSE PRACTITIONER

## 2024-11-11 PROCEDURE — 99397 PER PM REEVAL EST PAT 65+ YR: CPT | Performed by: NURSE PRACTITIONER

## 2024-11-11 PROCEDURE — 1159F MED LIST DOCD IN RCRD: CPT | Performed by: NURSE PRACTITIONER

## 2024-11-11 ASSESSMENT — PATIENT HEALTH QUESTIONNAIRE - PHQ9
2. FEELING DOWN, DEPRESSED OR HOPELESS: NOT AT ALL
1. LITTLE INTEREST OR PLEASURE IN DOING THINGS: NOT AT ALL
SUM OF ALL RESPONSES TO PHQ9 QUESTIONS 1 AND 2: 0

## 2024-11-11 ASSESSMENT — ACTIVITIES OF DAILY LIVING (ADL)
MANAGING_FINANCES: INDEPENDENT
DOING_HOUSEWORK: INDEPENDENT
GROCERY_SHOPPING: INDEPENDENT
TAKING_MEDICATION: INDEPENDENT
DRESSING: INDEPENDENT
BATHING: INDEPENDENT

## 2024-11-11 ASSESSMENT — ENCOUNTER SYMPTOMS
OCCASIONAL FEELINGS OF UNSTEADINESS: 0
DEPRESSION: 0
LOSS OF SENSATION IN FEET: 0

## 2024-11-11 ASSESSMENT — PAIN SCALES - GENERAL: PAINLEVEL_OUTOF10: 0-NO PAIN

## 2024-11-11 NOTE — PROGRESS NOTES
"Subjective   Reason for Visit: Sharon Caraballo is an 69 y.o. female here for a Medicare Wellness visit.     Past Medical, Surgical, and Family History reviewed and updated in chart.         Presents for annual wellness exam    Colonoscopy 6/15/2022 repeat due in 5-7 years     Mammogram: 2024 normal     DEXA: 2024     CT lun2024 - recommended annual screening. Has not scheduled for this year  +emphysema on CT  Denies any significant dyspnea with exertion  Has inhaler but does not use often. Does not help much.     Recently had flu and pneumonia vaccinations.  No RSV yet     Not currently seeing any specialists.  Tolerating medications well.        Patient Care Team:  Lily Rivera MD as PCP - General  Tomasz Sue MD MPH as PCP - [facility] Attributed Provider     Review of Systems    Objective   Vitals:  /78 (BP Location: Left arm, Patient Position: Sitting, BP Cuff Size: Large adult)   Pulse 78   Temp 36.2 °C (97.1 °F) (Temporal)   Ht 1.626 m (5' 4\")   Wt 76.5 kg (168 lb 9.6 oz)   SpO2 98%   BMI 28.94 kg/m²       Physical Exam  Vitals and nursing note reviewed.   Constitutional:       General: She is not in acute distress.     Appearance: Normal appearance. She is not toxic-appearing.   HENT:      Head: Normocephalic.      Right Ear: Tympanic membrane, ear canal and external ear normal.      Left Ear: Tympanic membrane, ear canal and external ear normal.      Nose: Nose normal.      Mouth/Throat:      Mouth: Mucous membranes are moist.      Pharynx: Oropharynx is clear.   Eyes:      Conjunctiva/sclera: Conjunctivae normal.   Neck:      Thyroid: Thyromegaly present.   Cardiovascular:      Rate and Rhythm: Normal rate and regular rhythm.      Pulses: Normal pulses.      Heart sounds: Normal heart sounds. No murmur heard.  Pulmonary:      Effort: Pulmonary effort is normal. No respiratory distress.      Breath sounds: Normal breath sounds.   Abdominal:      General: Bowel sounds are normal.    "   Palpations: Abdomen is soft.      Tenderness: There is no abdominal tenderness.   Musculoskeletal:         General: Normal range of motion.      Cervical back: Neck supple.      Right lower leg: No edema.      Left lower leg: No edema.   Lymphadenopathy:      Cervical: No cervical adenopathy.   Skin:     General: Skin is warm and dry.      Capillary Refill: Capillary refill takes less than 2 seconds.      Findings: No rash.   Neurological:      General: No focal deficit present.      Gait: Gait normal.   Psychiatric:         Mood and Affect: Mood normal.         Judgment: Judgment normal.         Assessment & Plan  Routine general medical examination at health care facility    Orders:    1 Year Follow Up In [facility] - PCP - Wellness Exam; Future    Comprehensive Metabolic Panel; Future    Encounter for immunization    Orders:    Pfizer COVID-19 vaccine, monovalent, age 12 years and older (30 mcg/0.3 mL) (Comirnaty)    Abnormal glucose    Orders:    Hemoglobin A1C - Lab collect; Future    Diabetes mellitus screening    Orders:    Hemoglobin A1C - Lab collect; Future    Screening for cardiovascular condition         Former smoker         Cardiac risk counseling         Routine general medical examination at a health care facility         Pulmonary emphysema, unspecified emphysema type (Multi)    Orders:    CBC and Auto Differential; Future    Goiter diffuse, nontoxic    Orders:    TSH; Future    T4, free; Future              Patient Instructions   Tips for your general wellness:  Remember the importance of daily aerobic exercise for 30 minutes to help with both your physical and mental/emotional health.    Take time twice a day to relax with focused breathing and relaxation.    Strive for healthy eating with plenty of water, fruits, vegetables, and choosing as much plant based diet as able  If you do consume non plant protein, choose fish high in omega-3 (like salmon or cod), skinless poultry, and  rarely anything from a cow  Avoid processed foods.  Shop the perimeter of the grocery store  - not the inner aisles where all the boxed, bagged, and canned processed foods are.  Avoid sugar - including fruit juices with added sugar and avoid artificial sweeteners (sucralose, aspartame).; if you want to use sweetener, use stevia (natural plant based non caloric sweetener)    Get plenty of sleep nightly - 7 hours minimum    Exercise 150min per week. Stretching daily and yoga or Robi Chi can help you maintain your strength and balance to prevent future falls.    Do not use tobacco    Abstain or limit alcohol to 1-2 drinks per 24 hours    Mammogram is recommended annually for breast cancer screening    Colon cancer screening should be done based on your risk and previous screening results.    Screening blood work to monitor your cholesterol and blood sugar should be completed every 3 years, if normal and you do not have other chronic conditions.    Immunizations are recommended to keep you healthy:  Flu shot annually  Pneumonia vaccination  Shingles vaccination  COVID booster  RSV vaccination  Tdap updated every 10 years    See your dentist yearly    Have an eye exam at least every 1-2 years    Return for fasting blood work    Follow up 1 year for annual wellness visit

## 2024-11-11 NOTE — PATIENT INSTRUCTIONS
Tips for your general wellness:  Remember the importance of daily aerobic exercise for 30 minutes to help with both your physical and mental/emotional health.    Take time twice a day to relax with focused breathing and relaxation.    Strive for healthy eating with plenty of water, fruits, vegetables, and choosing as much plant based diet as able  If you do consume non plant protein, choose fish high in omega-3 (like salmon or cod), skinless poultry, and rarely anything from a cow  Avoid processed foods.  Shop the perimeter of the grocery store  - not the inner aisles where all the boxed, bagged, and canned processed foods are.  Avoid sugar - including fruit juices with added sugar and avoid artificial sweeteners (sucralose, aspartame).; if you want to use sweetener, use stevia (natural plant based non caloric sweetener)    Get plenty of sleep nightly - 7 hours minimum    Exercise 150min per week. Stretching daily and yoga or Robi Chi can help you maintain your strength and balance to prevent future falls.    Do not use tobacco    Abstain or limit alcohol to 1-2 drinks per 24 hours    Mammogram is recommended annually for breast cancer screening    Colon cancer screening should be done based on your risk and previous screening results.    Screening blood work to monitor your cholesterol and blood sugar should be completed every 3 years, if normal and you do not have other chronic conditions.    Immunizations are recommended to keep you healthy:  Flu shot annually  Pneumonia vaccination  Shingles vaccination  COVID booster  RSV vaccination  Tdap updated every 10 years    See your dentist yearly    Have an eye exam at least every 1-2 years    Return for fasting blood work    Follow up 1 year for annual wellness visit

## 2024-11-12 ENCOUNTER — LAB (OUTPATIENT)
Dept: LAB | Facility: LAB | Age: 69
End: 2024-11-12
Payer: MEDICARE

## 2024-11-12 DIAGNOSIS — E04.0 GOITER DIFFUSE, NONTOXIC: Primary | ICD-10-CM

## 2024-11-12 DIAGNOSIS — I25.84 CORONARY ATHEROSCLEROSIS DUE TO CALCIFIED CORONARY LESION: ICD-10-CM

## 2024-11-12 DIAGNOSIS — I25.10 CORONARY ARTERY CALCIFICATION: ICD-10-CM

## 2024-11-12 DIAGNOSIS — Z00.00 ROUTINE GENERAL MEDICAL EXAMINATION AT HEALTH CARE FACILITY: ICD-10-CM

## 2024-11-12 DIAGNOSIS — I25.10 CORONARY ATHEROSCLEROSIS DUE TO CALCIFIED CORONARY LESION: ICD-10-CM

## 2024-11-12 DIAGNOSIS — E04.0 GOITER DIFFUSE, NONTOXIC: ICD-10-CM

## 2024-11-12 DIAGNOSIS — I25.10 ATHEROSCLEROSIS OF NATIVE CORONARY ARTERY OF NATIVE HEART, UNSPECIFIED WHETHER ANGINA PRESENT: ICD-10-CM

## 2024-11-12 DIAGNOSIS — E78.2 MIXED HYPERLIPIDEMIA: ICD-10-CM

## 2024-11-12 DIAGNOSIS — J43.9 PULMONARY EMPHYSEMA, UNSPECIFIED EMPHYSEMA TYPE (MULTI): ICD-10-CM

## 2024-11-12 DIAGNOSIS — R73.09 ABNORMAL GLUCOSE: ICD-10-CM

## 2024-11-12 DIAGNOSIS — I10 PRIMARY HYPERTENSION: ICD-10-CM

## 2024-11-12 DIAGNOSIS — Z13.1 DIABETES MELLITUS SCREENING: ICD-10-CM

## 2024-11-12 LAB
ALBUMIN SERPL BCP-MCNC: 4.7 G/DL (ref 3.4–5)
ALP SERPL-CCNC: 76 U/L (ref 33–136)
ALT SERPL W P-5'-P-CCNC: 22 U/L (ref 7–45)
ANION GAP SERPL CALC-SCNC: 13 MMOL/L (ref 10–20)
AST SERPL W P-5'-P-CCNC: 23 U/L (ref 9–39)
BASOPHILS # BLD AUTO: 0.03 X10*3/UL (ref 0–0.1)
BASOPHILS NFR BLD AUTO: 0.6 %
BILIRUB SERPL-MCNC: 0.5 MG/DL (ref 0–1.2)
BUN SERPL-MCNC: 11 MG/DL (ref 6–23)
CALCIUM SERPL-MCNC: 9.9 MG/DL (ref 8.6–10.6)
CHLORIDE SERPL-SCNC: 106 MMOL/L (ref 98–107)
CHOLEST SERPL-MCNC: 172 MG/DL (ref 0–199)
CHOLESTEROL/HDL RATIO: 2.4
CO2 SERPL-SCNC: 32 MMOL/L (ref 21–32)
CREAT SERPL-MCNC: 0.86 MG/DL (ref 0.5–1.05)
EGFRCR SERPLBLD CKD-EPI 2021: 74 ML/MIN/1.73M*2
EOSINOPHIL # BLD AUTO: 0.12 X10*3/UL (ref 0–0.7)
EOSINOPHIL NFR BLD AUTO: 2.5 %
ERYTHROCYTE [DISTWIDTH] IN BLOOD BY AUTOMATED COUNT: 14.3 % (ref 11.5–14.5)
EST. AVERAGE GLUCOSE BLD GHB EST-MCNC: 114 MG/DL
GLUCOSE SERPL-MCNC: 82 MG/DL (ref 74–99)
HBA1C MFR BLD: 5.6 %
HCT VFR BLD AUTO: 40.9 % (ref 36–46)
HDLC SERPL-MCNC: 72 MG/DL
HGB BLD-MCNC: 13.3 G/DL (ref 12–16)
IMM GRANULOCYTES # BLD AUTO: 0.01 X10*3/UL (ref 0–0.7)
IMM GRANULOCYTES NFR BLD AUTO: 0.2 % (ref 0–0.9)
LDLC SERPL CALC-MCNC: 76 MG/DL
LYMPHOCYTES # BLD AUTO: 0.7 X10*3/UL (ref 1.2–4.8)
LYMPHOCYTES NFR BLD AUTO: 14.3 %
MCH RBC QN AUTO: 29.4 PG (ref 26–34)
MCHC RBC AUTO-ENTMCNC: 32.5 G/DL (ref 32–36)
MCV RBC AUTO: 91 FL (ref 80–100)
MONOCYTES # BLD AUTO: 0.43 X10*3/UL (ref 0.1–1)
MONOCYTES NFR BLD AUTO: 8.8 %
NEUTROPHILS # BLD AUTO: 3.6 X10*3/UL (ref 1.2–7.7)
NEUTROPHILS NFR BLD AUTO: 73.6 %
NON HDL CHOLESTEROL: 100 MG/DL (ref 0–149)
NRBC BLD-RTO: 0 /100 WBCS (ref 0–0)
PLATELET # BLD AUTO: 379 X10*3/UL (ref 150–450)
POTASSIUM SERPL-SCNC: 4.7 MMOL/L (ref 3.5–5.3)
PROT SERPL-MCNC: 7.5 G/DL (ref 6.4–8.2)
RBC # BLD AUTO: 4.52 X10*6/UL (ref 4–5.2)
SODIUM SERPL-SCNC: 146 MMOL/L (ref 136–145)
T4 FREE SERPL-MCNC: 1.38 NG/DL (ref 0.78–1.48)
TRIGL SERPL-MCNC: 122 MG/DL (ref 0–149)
TSH SERPL-ACNC: 0.17 MIU/L (ref 0.44–3.98)
VLDL: 24 MG/DL (ref 0–40)
WBC # BLD AUTO: 4.9 X10*3/UL (ref 4.4–11.3)

## 2024-11-12 PROCEDURE — 36415 COLL VENOUS BLD VENIPUNCTURE: CPT

## 2024-11-12 PROCEDURE — 85025 COMPLETE CBC W/AUTO DIFF WBC: CPT

## 2024-11-12 PROCEDURE — 84439 ASSAY OF FREE THYROXINE: CPT

## 2024-11-12 PROCEDURE — 84443 ASSAY THYROID STIM HORMONE: CPT

## 2024-11-12 PROCEDURE — 80053 COMPREHEN METABOLIC PANEL: CPT

## 2024-11-12 PROCEDURE — 83036 HEMOGLOBIN GLYCOSYLATED A1C: CPT

## 2024-11-12 PROCEDURE — 80061 LIPID PANEL: CPT

## 2024-11-21 ENCOUNTER — HOSPITAL ENCOUNTER (OUTPATIENT)
Dept: RADIOLOGY | Facility: CLINIC | Age: 69
Discharge: HOME | End: 2024-11-21
Payer: MEDICARE

## 2024-11-21 DIAGNOSIS — E04.0 GOITER DIFFUSE, NONTOXIC: ICD-10-CM

## 2024-11-21 PROCEDURE — 76536 US EXAM OF HEAD AND NECK: CPT | Performed by: RADIOLOGY

## 2024-11-21 PROCEDURE — 76536 US EXAM OF HEAD AND NECK: CPT

## 2024-11-25 DIAGNOSIS — E04.0 GOITER DIFFUSE, NONTOXIC: Primary | ICD-10-CM

## 2024-12-23 ENCOUNTER — TELEPHONE (OUTPATIENT)
Dept: PRIMARY CARE | Facility: CLINIC | Age: 69
End: 2024-12-23
Payer: MEDICARE

## 2024-12-26 ENCOUNTER — APPOINTMENT (OUTPATIENT)
Dept: PRIMARY CARE | Facility: CLINIC | Age: 69
End: 2024-12-26
Payer: MEDICARE

## 2024-12-29 DIAGNOSIS — K21.9 GASTROESOPHAGEAL REFLUX DISEASE WITHOUT ESOPHAGITIS: ICD-10-CM

## 2024-12-29 RX ORDER — PANTOPRAZOLE SODIUM 40 MG/1
40 TABLET, DELAYED RELEASE ORAL DAILY
Qty: 90 TABLET | Refills: 1 | Status: SHIPPED | OUTPATIENT
Start: 2024-12-29 | End: 2025-06-27

## 2025-01-22 ENCOUNTER — HOSPITAL ENCOUNTER (OUTPATIENT)
Dept: RADIOLOGY | Facility: HOSPITAL | Age: 70
Discharge: HOME | End: 2025-01-22
Payer: MEDICARE

## 2025-01-22 VITALS
DIASTOLIC BLOOD PRESSURE: 60 MMHG | HEART RATE: 61 BPM | RESPIRATION RATE: 18 BRPM | TEMPERATURE: 97 F | SYSTOLIC BLOOD PRESSURE: 125 MMHG | OXYGEN SATURATION: 94 %

## 2025-01-22 DIAGNOSIS — E04.0 GOITER DIFFUSE, NONTOXIC: ICD-10-CM

## 2025-01-22 PROCEDURE — 2720000007 HC OR 272 NO HCPCS

## 2025-01-22 PROCEDURE — 99153 MOD SED SAME PHYS/QHP EA: CPT

## 2025-01-22 PROCEDURE — 7100000009 HC PHASE TWO TIME - INITIAL BASE CHARGE

## 2025-01-22 PROCEDURE — 88112 CYTOPATH CELL ENHANCE TECH: CPT | Mod: TC,MCY

## 2025-01-22 PROCEDURE — 2500000004 HC RX 250 GENERAL PHARMACY W/ HCPCS (ALT 636 FOR OP/ED): Performed by: RADIOLOGY

## 2025-01-22 PROCEDURE — 99152 MOD SED SAME PHYS/QHP 5/>YRS: CPT

## 2025-01-22 PROCEDURE — 99152 MOD SED SAME PHYS/QHP 5/>YRS: CPT | Performed by: RADIOLOGY

## 2025-01-22 PROCEDURE — 99153 MOD SED SAME PHYS/QHP EA: CPT | Performed by: RADIOLOGY

## 2025-01-22 PROCEDURE — 7100000010 HC PHASE TWO TIME - EACH INCREMENTAL 1 MINUTE

## 2025-01-22 PROCEDURE — 76942 ECHO GUIDE FOR BIOPSY: CPT

## 2025-01-22 RX ORDER — FENTANYL CITRATE 50 UG/ML
INJECTION, SOLUTION INTRAMUSCULAR; INTRAVENOUS
Status: COMPLETED | OUTPATIENT
Start: 2025-01-22 | End: 2025-01-22

## 2025-01-22 RX ORDER — MIDAZOLAM HYDROCHLORIDE 1 MG/ML
INJECTION INTRAMUSCULAR; INTRAVENOUS
Status: COMPLETED | OUTPATIENT
Start: 2025-01-22 | End: 2025-01-22

## 2025-01-22 RX ADMIN — MIDAZOLAM HYDROCHLORIDE 1 MG: 1 INJECTION, SOLUTION INTRAMUSCULAR; INTRAVENOUS at 13:38

## 2025-01-22 RX ADMIN — FENTANYL CITRATE 50 MCG: 50 INJECTION, SOLUTION INTRAMUSCULAR; INTRAVENOUS at 13:17

## 2025-01-22 RX ADMIN — FENTANYL CITRATE 50 MCG: 50 INJECTION, SOLUTION INTRAMUSCULAR; INTRAVENOUS at 13:38

## 2025-01-22 RX ADMIN — MIDAZOLAM HYDROCHLORIDE 1 MG: 1 INJECTION, SOLUTION INTRAMUSCULAR; INTRAVENOUS at 13:17

## 2025-01-22 ASSESSMENT — PAIN SCALES - GENERAL
PAINLEVEL_OUTOF10: 0 - NO PAIN

## 2025-01-22 ASSESSMENT — PAIN - FUNCTIONAL ASSESSMENT
PAIN_FUNCTIONAL_ASSESSMENT: 0-10

## 2025-01-22 NOTE — PRE-PROCEDURE NOTE
Interventional Radiology Preprocedure Note    Indication for procedure: The encounter diagnosis was Goiter diffuse, nontoxic.    Relevant review of systems: NA    Relevant Labs:   Lab Results   Component Value Date    CREATININE 0.86 11/12/2024    EGFR 74 11/12/2024    INR 1.0 02/23/2022    PROTIME 11.5 02/23/2022       Planned Sedation/Anesthesia: Moderate    Airway assessment: normal    Directed physical examination:    Physical Exam  Constitutional:       Appearance: Normal appearance.   Pulmonary:      Effort: Pulmonary effort is normal. No respiratory distress.   Skin:     General: Skin is warm and dry.   Neurological:      General: No focal deficit present.      Mental Status: She is alert.   Psychiatric:         Mood and Affect: Mood normal.         Behavior: Behavior normal.          Mallampati: II (hard and soft palate, upper portion of tonsils and uvula visible)    ASA Score: ASA 1 - Normal health patient    Benefits, risks and alternatives of procedure and planned sedation have been discussed with the patient and/or their representative. All questions answered and they agree to proceed.

## 2025-01-22 NOTE — POST-PROCEDURE NOTE
Interventional Radiology Post-Procedure Note    Left thyroid nodule biopsy    Procedure Details:  Technically successful and uncomplicated left thyroid nodule biopsy.  Please see PACS for full procedural details.    Patient Tolerance: good  Complications: None    Indication for procedure: The encounter diagnosis was Goiter diffuse, nontoxic.    Pre-Procedure Verification and Time Out:  · Procedure Location procedure area   · HUDDLE - Pre-procedure Verification completed   · TIME OUT - Final Verification completed immediately prior to procedure start   · DEBRIEF completed     General Information:  Date/Time of Procedure: 01/22/25 at 1:48 PM  Indication(s): multinodular goiter  Findings: See PACS  Procedure performed by: Blas Jewell MD   Assistant(s): Dr. Nella Morrison MD  Estimated Blood Loss (mL): minimal  Specimen: Yes, 2 core specimens, 4 FNA samples  Informed Consent: written consent obtained    Prep:  Ultrasound Guided Insertion: Yes  Large Drape, Hand Hygiene, Surgical Cap, Surgical Mask, Sterile Gown, Sterile Gloves, Glasses, and Scrubs  Patient Position: Supine  Site Prep: chlorhexidine, draped, usual sterile procedure followed    Anesthesia/Medications:  Procedural Sedation:  Fentanyl: 100 mcg  Versed: 2 mg  1% Lidocaine: 8 mL    Blas Jewell MD, PGY-6  Interventional Radiology  IR pager: [phone]    NON-Urgent on call weekends and after hours weekdays (5pm - 5am) IR pager: [phone]  Urgent & emergent on call weekends and after hours weekdays (5pm-7am) IR pager: [phone]

## 2025-01-22 NOTE — DISCHARGE INSTRUCTIONS
You received moderate sedation:  - Do not drive a car, or operate any machinery or power tools of any kind for 24 hours.  - Do not drink any alcoholic drinks for 24 hours.  - Do not take any over the counter medications that may cause drowsiness for 24 hours.  - Do not make any important decisions or sign any legal documents for 24 hours.  - You need to have a responsible adult accompany you home.  - You may resume your normal diet.  - We strongly suggest that a responsible adult be with you for the rest of the day and also during the night. This is for your protection and safety.     For questions related to your procedure:  Please call [phone] between the hours of 7:00am-5:00pm Monday through Friday.  Please call [phone] after 5:00pm and on weekends and holidays.     In the event of an emergency call [phone] or go to your nearest emergency room.

## 2025-01-23 LAB
LABORATORY COMMENT REPORT: NORMAL
LABORATORY COMMENT REPORT: NORMAL
PATH REPORT.FINAL DX SPEC: NORMAL
PATH REPORT.GROSS SPEC: NORMAL
PATH REPORT.RELEVANT HX SPEC: NORMAL
PATH REPORT.TOTAL CANCER: NORMAL
RESIDENT REVIEW: NORMAL

## 2025-01-27 LAB
LABORATORY COMMENT REPORT: NORMAL
PATH REPORT.FINAL DX SPEC: NORMAL
PATH REPORT.GROSS SPEC: NORMAL
PATH REPORT.RELEVANT HX SPEC: NORMAL
PATH REPORT.TOTAL CANCER: NORMAL

## 2025-02-05 DIAGNOSIS — E78.2 MIXED HYPERLIPIDEMIA: ICD-10-CM

## 2025-02-06 RX ORDER — ATORVASTATIN CALCIUM 80 MG/1
80 TABLET, FILM COATED ORAL DAILY
Qty: 100 TABLET | Refills: 3 | Status: SHIPPED | OUTPATIENT
Start: 2025-02-06 | End: 2026-03-13

## 2025-02-25 ENCOUNTER — OFFICE VISIT (OUTPATIENT)
Dept: PRIMARY CARE | Facility: CLINIC | Age: 70
End: 2025-02-25
Payer: MEDICARE

## 2025-02-25 VITALS
TEMPERATURE: 97.3 F | WEIGHT: 175.8 LBS | BODY MASS INDEX: 30.01 KG/M2 | HEART RATE: 75 BPM | OXYGEN SATURATION: 93 % | HEIGHT: 64 IN | SYSTOLIC BLOOD PRESSURE: 124 MMHG | DIASTOLIC BLOOD PRESSURE: 70 MMHG

## 2025-02-25 DIAGNOSIS — J43.9 PULMONARY EMPHYSEMA, UNSPECIFIED EMPHYSEMA TYPE (MULTI): ICD-10-CM

## 2025-02-25 DIAGNOSIS — K21.9 GASTROESOPHAGEAL REFLUX DISEASE WITHOUT ESOPHAGITIS: ICD-10-CM

## 2025-02-25 DIAGNOSIS — K92.1 BLACK STOOLS: Primary | ICD-10-CM

## 2025-02-25 PROCEDURE — 1160F RVW MEDS BY RX/DR IN RCRD: CPT | Performed by: NURSE PRACTITIONER

## 2025-02-25 PROCEDURE — 1159F MED LIST DOCD IN RCRD: CPT | Performed by: NURSE PRACTITIONER

## 2025-02-25 PROCEDURE — 3078F DIAST BP <80 MM HG: CPT | Performed by: NURSE PRACTITIONER

## 2025-02-25 PROCEDURE — 1123F ACP DISCUSS/DSCN MKR DOCD: CPT | Performed by: NURSE PRACTITIONER

## 2025-02-25 PROCEDURE — 1036F TOBACCO NON-USER: CPT | Performed by: NURSE PRACTITIONER

## 2025-02-25 PROCEDURE — 3074F SYST BP LT 130 MM HG: CPT | Performed by: NURSE PRACTITIONER

## 2025-02-25 PROCEDURE — 99214 OFFICE O/P EST MOD 30 MIN: CPT | Performed by: NURSE PRACTITIONER

## 2025-02-25 PROCEDURE — 1126F AMNT PAIN NOTED NONE PRSNT: CPT | Performed by: NURSE PRACTITIONER

## 2025-02-25 PROCEDURE — 3008F BODY MASS INDEX DOCD: CPT | Performed by: NURSE PRACTITIONER

## 2025-02-25 RX ORDER — PANTOPRAZOLE SODIUM 40 MG/1
40 TABLET, DELAYED RELEASE ORAL 2 TIMES DAILY
Qty: 180 TABLET | Refills: 0 | Status: SHIPPED | OUTPATIENT
Start: 2025-02-25 | End: 2025-05-26

## 2025-02-25 ASSESSMENT — PAIN SCALES - GENERAL: PAINLEVEL_OUTOF10: 0-NO PAIN

## 2025-02-25 NOTE — PATIENT INSTRUCTIONS
Check blood work today  Refer for EGD and colonoscopy for further evaluate for underlying cause of black stools  Increase pantoprazole to twice daily for 2-4 weeks for symptom control  H. Pylori testing can be done with EGD  Further recommendations based on results of testing

## 2025-02-25 NOTE — PROGRESS NOTES
"Subjective   Patient ID: Sharon Caraballo is a 69 y.o. female who presents for Black or Bloody Stool    Presents for black tarry stools x 5-6 days  Denies any abdomen pain, nausea, vomiting, appetite changes  +increased gas, bloating  Has noted increased swelling in ankles, improves when elevating  Ongoing SOB, denies any CP  Taking pantoprazole daily. Continue to have some breakthrough heartburn, rare.  Last colonoscopy 2022: 4 polyps, 7y FU  +diverticulosis  Never had EGD    Fam Hx: Sister with stomach cancer, other sister with ovarian cancer      Review of Systems    Objective     /70 (BP Location: Right arm, Patient Position: Sitting, BP Cuff Size: Adult)   Pulse 75   Temp 36.3 °C (97.3 °F) (Temporal)   Ht 1.626 m (5' 4\")   Wt 79.7 kg (175 lb 12.8 oz)   SpO2 93%   BMI 30.18 kg/m²      Physical Exam  Vitals and nursing note reviewed.   Constitutional:       General: She is not in acute distress.     Appearance: Normal appearance. She is obese.   Neck:      Thyroid: Thyromegaly present.   Cardiovascular:      Rate and Rhythm: Normal rate and regular rhythm.      Heart sounds: Normal heart sounds. No murmur heard.  Pulmonary:      Effort: Pulmonary effort is normal. No respiratory distress.      Breath sounds: Normal breath sounds.   Abdominal:      General: Bowel sounds are normal.      Palpations: Abdomen is soft. There is no mass.      Tenderness: There is no abdominal tenderness. There is no guarding.   Musculoskeletal:      Right lower le+ Pitting Edema present.      Left lower le+ Pitting Edema present.   Lymphadenopathy:      Cervical: No cervical adenopathy.   Skin:     General: Skin is warm.      Capillary Refill: Capillary refill takes less than 2 seconds.      Findings: No rash.          Assessment/Plan   Diagnoses and all orders for this visit:  Black stools  -     CBC and Auto Differential  -     Comprehensive metabolic panel  -     Ferritin  -     Colonoscopy Diagnostic; Future  -    "  Esophagogastroduodenoscopy (EGD); Future  Gastroesophageal reflux disease without esophagitis  -     pantoprazole (ProtoNix) 40 mg EC tablet; Take 1 tablet (40 mg) by mouth 2 times a day.  Pulmonary emphysema, unspecified emphysema type (Multi)  -     Disability Placard      Patient Instructions   Check blood work today  Refer for EGD and colonoscopy for further evaluate for underlying cause of black stools  Increase pantoprazole to twice daily for 2-4 weeks for symptom control  H. Pylori testing can be done with EGD  Further recommendations based on results of testing

## 2025-02-26 DIAGNOSIS — Z12.11 COLON CANCER SCREENING: ICD-10-CM

## 2025-02-26 RX ORDER — SODIUM, POTASSIUM,MAG SULFATES 17.5-3.13G
SOLUTION, RECONSTITUTED, ORAL ORAL
Qty: 1 EACH | Refills: 0 | Status: SHIPPED | OUTPATIENT
Start: 2025-02-26

## 2025-02-27 DIAGNOSIS — Z12.11 COLON CANCER SCREENING: ICD-10-CM

## 2025-02-27 RX ORDER — POLYETHYLENE GLYCOL 3350, SODIUM SULFATE ANHYDROUS, SODIUM BICARBONATE, SODIUM CHLORIDE, POTASSIUM CHLORIDE 236; 22.74; 6.74; 5.86; 2.97 G/4L; G/4L; G/4L; G/4L; G/4L
POWDER, FOR SOLUTION ORAL
Qty: 4000 ML | Refills: 0 | Status: SHIPPED | OUTPATIENT
Start: 2025-02-27

## 2025-02-28 LAB
ALBUMIN SERPL-MCNC: 4.4 G/DL (ref 3.6–5.1)
ALP SERPL-CCNC: 52 U/L (ref 37–153)
ALT SERPL-CCNC: 23 U/L (ref 6–29)
ANION GAP SERPL CALCULATED.4IONS-SCNC: 6 MMOL/L (CALC) (ref 7–17)
AST SERPL-CCNC: 23 U/L (ref 10–35)
BILIRUB SERPL-MCNC: 0.3 MG/DL (ref 0.2–1.2)
BUN SERPL-MCNC: 19 MG/DL (ref 7–25)
CALCIUM SERPL-MCNC: 9.9 MG/DL (ref 8.6–10.4)
CHLORIDE SERPL-SCNC: 104 MMOL/L (ref 98–110)
CO2 SERPL-SCNC: 32 MMOL/L (ref 20–32)
CREAT SERPL-MCNC: 0.89 MG/DL (ref 0.5–1.05)
EGFRCR SERPLBLD CKD-EPI 2021: 71 ML/MIN/1.73M2
FERRITIN SERPL-MCNC: 56 NG/ML (ref 16–288)
GLUCOSE SERPL-MCNC: 100 MG/DL (ref 65–99)
POTASSIUM SERPL-SCNC: 4.6 MMOL/L (ref 3.5–5.3)
PROT SERPL-MCNC: 6.7 G/DL (ref 6.1–8.1)
SODIUM SERPL-SCNC: 142 MMOL/L (ref 135–146)
WBC # BLD AUTO: NORMAL THOUSAND/UL

## 2025-03-15 ENCOUNTER — OFFICE VISIT (OUTPATIENT)
Dept: URGENT CARE | Age: 70
End: 2025-03-15
Payer: MEDICARE

## 2025-03-15 VITALS
HEIGHT: 64 IN | DIASTOLIC BLOOD PRESSURE: 75 MMHG | SYSTOLIC BLOOD PRESSURE: 158 MMHG | OXYGEN SATURATION: 96 % | TEMPERATURE: 99.1 F | BODY MASS INDEX: 29.02 KG/M2 | RESPIRATION RATE: 18 BRPM | WEIGHT: 170 LBS | HEART RATE: 83 BPM

## 2025-03-15 DIAGNOSIS — J43.9 PULMONARY EMPHYSEMA, UNSPECIFIED EMPHYSEMA TYPE (MULTI): ICD-10-CM

## 2025-03-15 DIAGNOSIS — R73.03 PREDIABETES: ICD-10-CM

## 2025-03-15 DIAGNOSIS — J44.1 COPD EXACERBATION (MULTI): ICD-10-CM

## 2025-03-15 DIAGNOSIS — I10 PRIMARY HYPERTENSION: Primary | ICD-10-CM

## 2025-03-15 DIAGNOSIS — R05.9 COUGH, UNSPECIFIED TYPE: ICD-10-CM

## 2025-03-15 LAB
POC RAPID INFLUENZA A: NEGATIVE
POC RAPID INFLUENZA B: NEGATIVE
POC RAPID STREP: NEGATIVE
POC SARS-COV-2 AG BINAX: NORMAL

## 2025-03-15 RX ORDER — AZITHROMYCIN 250 MG/1
TABLET, FILM COATED ORAL
Qty: 6 TABLET | Refills: 0 | Status: SHIPPED | OUTPATIENT
Start: 2025-03-15

## 2025-03-15 RX ORDER — IPRATROPIUM BROMIDE AND ALBUTEROL SULFATE 2.5; .5 MG/3ML; MG/3ML
3 SOLUTION RESPIRATORY (INHALATION) ONCE
Status: COMPLETED | OUTPATIENT
Start: 2025-03-15 | End: 2025-03-15

## 2025-03-15 RX ORDER — BENZONATATE 200 MG/1
200 CAPSULE ORAL 3 TIMES DAILY PRN
Qty: 30 CAPSULE | Refills: 0 | Status: SHIPPED | OUTPATIENT
Start: 2025-03-15 | End: 2025-03-25

## 2025-03-15 RX ORDER — ALBUTEROL SULFATE 90 UG/1
2 INHALANT RESPIRATORY (INHALATION) EVERY 4 HOURS PRN
Qty: 18 G | Refills: 0 | Status: SHIPPED | OUTPATIENT
Start: 2025-03-15 | End: 2025-04-14

## 2025-03-15 RX ADMIN — IPRATROPIUM BROMIDE AND ALBUTEROL SULFATE 3 ML: 2.5; .5 SOLUTION RESPIRATORY (INHALATION) at 19:21

## 2025-03-15 ASSESSMENT — ENCOUNTER SYMPTOMS
WHEEZING: 1
CARDIOVASCULAR NEGATIVE: 1
COUGH: 1
MUSCULOSKELETAL NEGATIVE: 1
FEVER: 1
PSYCHIATRIC NEGATIVE: 1
FATIGUE: 1
SHORTNESS OF BREATH: 0
NEUROLOGICAL NEGATIVE: 1

## 2025-03-15 ASSESSMENT — PAIN SCALES - GENERAL: PAINLEVEL_OUTOF10: 5

## 2025-03-15 NOTE — PATIENT INSTRUCTIONS
Treating for Bacterial lung infection based on Signs, symptoms, and examination. No xray available in clinic today.  Lungs sounds improved after nebulizer treatment. No evidence of sepsis or acute respiratory distress at this time. Will treat with appropriate antibiotics for age group/risk factors. Patient is advised if symptoms change or worsen go to ED for further evaluation and care. Otherwise follow-up with family doctor for recheck within 1-2 weeks.

## 2025-03-15 NOTE — PROGRESS NOTES
"Subjective   Patient ID: Sharon Caraballo is a 69 y.o. female. They present today with a chief complaint of Cough (Coughing sore throat when coughing 2 days ).    History of Present Illness  C/O productive cough, wheezing, has emphysema is not using any of her recommended inhalers, \"to expensive or .\"        Cough  Associated symptoms include a fever and wheezing. Pertinent negatives include no shortness of breath.       Past Medical History  Allergies as of 03/15/2025    (No Known Allergies)       (Not in a hospital admission)       Past Medical History:   Diagnosis Date    Personal history of other diseases of the nervous system and sense organs 2021    History of acute conjunctivitis    Shortness of breath 2021    SOB (shortness of breath) on exertion       Past Surgical History:   Procedure Laterality Date    OTHER SURGICAL HISTORY  2022    Hysterectomy        reports that she has never smoked. She has never used smokeless tobacco. She reports current alcohol use. She reports that she does not currently use drugs.    Review of Systems  Review of Systems   Constitutional:  Positive for fatigue and fever.   HENT:  Positive for congestion.    Respiratory:  Positive for cough and wheezing. Negative for shortness of breath.    Cardiovascular: Negative.    Musculoskeletal: Negative.    Neurological: Negative.    Psychiatric/Behavioral: Negative.                                    Objective    Vitals:    03/15/25 1806   BP: 158/75   BP Location: Left arm   Patient Position: Sitting   BP Cuff Size: Small adult   Pulse: 83   Resp: 18   Temp: 37.3 °C (99.1 °F)   TempSrc: Oral   SpO2: 96%   Weight: 77.1 kg (170 lb)   Height: 1.626 m (5' 4\")     No LMP recorded. Patient is postmenopausal.    Physical Exam  Constitutional:       Appearance: She is ill-appearing.   HENT:      Right Ear: Tympanic membrane, ear canal and external ear normal.      Left Ear: Tympanic membrane, ear canal and external ear " normal.      Nose: Nose normal.   Cardiovascular:      Rate and Rhythm: Normal rate and regular rhythm.      Pulses: Normal pulses.      Heart sounds: Normal heart sounds.   Pulmonary:      Effort: No respiratory distress.      Breath sounds: Wheezing present.   Chest:      Chest wall: No tenderness.   Musculoskeletal:         General: Normal range of motion.   Skin:     General: Skin is warm and dry.      Capillary Refill: Capillary refill takes less than 2 seconds.   Neurological:      General: No focal deficit present.      Mental Status: She is alert and oriented to person, place, and time.   Psychiatric:         Mood and Affect: Mood normal.         Behavior: Behavior normal.         Thought Content: Thought content normal.         Judgment: Judgment normal.         Procedures    Point of Care Test & Imaging Results from this visit  Results for orders placed or performed in visit on 03/15/25   POCT Influenza A/B manually resulted   Result Value Ref Range    POC Rapid Influenza A Negative Negative    POC Rapid Influenza B Negative Negative   POCT rapid strep A manually resulted   Result Value Ref Range    POC Rapid Strep Negative Negative   POCT BinaxNOW Covid-19 Ag Card manually resulted   Result Value Ref Range    POC JAIR-COV-2 AG  Presumptive negative test for SARS-CoV-2 (no antigen detected)     Presumptive negative test for SARS-CoV-2 (no antigen detected)      No results found.    Diagnostic study results (if any) were reviewed by BINH Pereira.    Assessment/Plan   Allergies, medications, history, and pertinent labs/EKGs/Imaging reviewed by BINH Pereira.     Medical Decision Making  Treating for Bacterial lung infection based on Signs, symptoms, and examination. No xray available in clinic today.  Lungs sounds improved after nebulizer treatment. No evidence of sepsis or acute respiratory distress at this time. Will treat with appropriate antibiotics for age group/risk factors.  (Azithromycin, albuterol, and benzonatate, otc Mucinex, pt declined prednisone.  Patient is advised if symptoms change or worsen go to ED for further evaluation and care. Otherwise follow-up with family doctor for recheck within 1-2 weeks.     Orders and Diagnoses  Diagnoses and all orders for this visit:  Primary hypertension  Cough, unspecified type  -     POCT Influenza A/B manually resulted  -     POCT rapid strep A manually resulted  -     POCT BinaxNOW Covid-19 Ag Card manually resulted  -     azithromycin (Zithromax) 250 mg tablet; Take 2 tabs (500 mg) by mouth today, than 1 daily for 4 days.  -     benzonatate (Tessalon) 200 mg capsule; Take 1 capsule (200 mg) by mouth 3 times a day as needed for cough for up to 10 days. Do not crush or chew.  Prediabetes  Pulmonary emphysema, unspecified emphysema type (Multi)  -     ipratropium-albuteroL (Duo-Neb) 0.5-2.5 mg/3 mL nebulizer solution 3 mL  -     albuterol 90 mcg/actuation inhaler; Inhale 2 puffs every 4 hours if needed for wheezing.  COPD exacerbation (Multi)  -     ipratropium-albuteroL (Duo-Neb) 0.5-2.5 mg/3 mL nebulizer solution 3 mL      Medical Admin Record  Administrations This Visit       ipratropium-albuteroL (Duo-Neb) 0.5-2.5 mg/3 mL nebulizer solution 3 mL       Admin Date  03/15/2025 Action  Given Dose  3 mL Route  nebulization Documented By  Sury Suh MA                    Patient disposition: Home    Electronically signed by BINH Pereira  7:44 PM

## 2025-03-18 DIAGNOSIS — I10 PRIMARY HYPERTENSION: ICD-10-CM

## 2025-03-19 RX ORDER — HYDROCHLOROTHIAZIDE 25 MG/1
TABLET ORAL
Qty: 90 TABLET | Refills: 1 | Status: SHIPPED | OUTPATIENT
Start: 2025-03-19

## 2025-03-25 ENCOUNTER — OFFICE VISIT (OUTPATIENT)
Dept: PRIMARY CARE | Facility: CLINIC | Age: 70
End: 2025-03-25
Payer: MEDICARE

## 2025-03-25 VITALS
DIASTOLIC BLOOD PRESSURE: 74 MMHG | SYSTOLIC BLOOD PRESSURE: 112 MMHG | TEMPERATURE: 97.3 F | WEIGHT: 175.6 LBS | BODY MASS INDEX: 29.98 KG/M2 | HEART RATE: 78 BPM | HEIGHT: 64 IN | OXYGEN SATURATION: 98 %

## 2025-03-25 DIAGNOSIS — J43.9 PULMONARY EMPHYSEMA, UNSPECIFIED EMPHYSEMA TYPE (MULTI): ICD-10-CM

## 2025-03-25 DIAGNOSIS — R05.9 COUGH, UNSPECIFIED TYPE: Primary | ICD-10-CM

## 2025-03-25 PROCEDURE — 3074F SYST BP LT 130 MM HG: CPT | Performed by: NURSE PRACTITIONER

## 2025-03-25 PROCEDURE — 3008F BODY MASS INDEX DOCD: CPT | Performed by: NURSE PRACTITIONER

## 2025-03-25 PROCEDURE — 3078F DIAST BP <80 MM HG: CPT | Performed by: NURSE PRACTITIONER

## 2025-03-25 PROCEDURE — 1160F RVW MEDS BY RX/DR IN RCRD: CPT | Performed by: NURSE PRACTITIONER

## 2025-03-25 PROCEDURE — 1126F AMNT PAIN NOTED NONE PRSNT: CPT | Performed by: NURSE PRACTITIONER

## 2025-03-25 PROCEDURE — 99214 OFFICE O/P EST MOD 30 MIN: CPT | Performed by: NURSE PRACTITIONER

## 2025-03-25 PROCEDURE — 1159F MED LIST DOCD IN RCRD: CPT | Performed by: NURSE PRACTITIONER

## 2025-03-25 PROCEDURE — 1123F ACP DISCUSS/DSCN MKR DOCD: CPT | Performed by: NURSE PRACTITIONER

## 2025-03-25 PROCEDURE — 1036F TOBACCO NON-USER: CPT | Performed by: NURSE PRACTITIONER

## 2025-03-25 RX ORDER — AZITHROMYCIN 250 MG/1
TABLET, FILM COATED ORAL
Qty: 6 TABLET | Refills: 0 | Status: SHIPPED | OUTPATIENT
Start: 2025-03-25

## 2025-03-25 ASSESSMENT — ENCOUNTER SYMPTOMS
MYALGIAS: 0
SWEATS: 0
CHILLS: 0
HEMOPTYSIS: 0
RHINORRHEA: 0
SHORTNESS OF BREATH: 0
HEADACHES: 0
HEARTBURN: 0
WEIGHT LOSS: 0
FEVER: 0
WHEEZING: 1
COUGH: 1
SORE THROAT: 0

## 2025-03-25 ASSESSMENT — PAIN SCALES - GENERAL: PAINLEVEL_OUTOF10: 0-NO PAIN

## 2025-03-25 NOTE — PROGRESS NOTES
"Subjective   Patient ID: Sharon Caraballo is a 69 y.o. female who presents for Cough (1 week).    Presents for cough, following up from   Sick 2 weeks ago, seen in Urgent care 3/15 for cough, COPD exacerbation  Records reviewed.  Negative for flu, covid, strep in   Given zpak, tessalon - missed last dose of Zpak when left to go out of town.  Not using albuterol, does not feel helps  Cough has improved but still coughing, minimally productive      Cough  This is a new problem. The current episode started 1 to 4 weeks ago. The problem has been gradually improving. The problem occurs every few minutes. The cough is Productive of purulent sputum. Associated symptoms include wheezing. Pertinent negatives include no chest pain, chills, ear congestion, ear pain, fever, headaches, heartburn, hemoptysis, myalgias, nasal congestion, postnasal drip, rash, rhinorrhea, sore throat, shortness of breath, sweats or weight loss. Nothing aggravates the symptoms.       Review of Systems   Constitutional:  Negative for chills, fever and weight loss.   HENT:  Negative for ear pain, postnasal drip, rhinorrhea and sore throat.    Respiratory:  Positive for cough and wheezing. Negative for hemoptysis and shortness of breath.    Cardiovascular:  Negative for chest pain.   Gastrointestinal:  Negative for heartburn.   Musculoskeletal:  Negative for myalgias.   Skin:  Negative for rash.   Neurological:  Negative for headaches.       Objective     /74 (BP Location: Left arm, Patient Position: Sitting, BP Cuff Size: Large adult)   Pulse 78   Temp 36.3 °C (97.3 °F) (Temporal)   Ht 1.626 m (5' 4\")   Wt 79.7 kg (175 lb 9.6 oz)   SpO2 98%   BMI 30.14 kg/m²      Physical Exam  Vitals and nursing note reviewed.   Constitutional:       General: She is not in acute distress.     Appearance: Normal appearance. She is not ill-appearing or toxic-appearing.   HENT:      Head: Normocephalic.      Right Ear: Tympanic membrane, ear canal and " external ear normal.      Left Ear: Tympanic membrane, ear canal and external ear normal.      Nose: Nose normal.      Right Turbinates: Swollen.      Left Turbinates: Not swollen.      Mouth/Throat:      Mouth: Mucous membranes are moist.   Eyes:      Conjunctiva/sclera: Conjunctivae normal.   Cardiovascular:      Rate and Rhythm: Normal rate and regular rhythm.      Heart sounds: Normal heart sounds.   Pulmonary:      Effort: Pulmonary effort is normal. No respiratory distress.      Breath sounds: Normal breath sounds.      Comments: Intermittent dry cough  Lymphadenopathy:      Cervical: Cervical adenopathy present.         Assessment/Plan   Diagnoses and all orders for this visit:  Cough, unspecified type  -     azithromycin (Zithromax) 250 mg tablet; Take 2 tabs (500 mg) by mouth today, than 1 daily for 4 days.  Pulmonary emphysema, unspecified emphysema type (Multi)      Patient Instructions   Repeat Zpak for symptoms   Increase fluids and rest  Follow up if symptoms not improving with treatments

## 2025-03-25 NOTE — PATIENT INSTRUCTIONS
Repeat Zpak for symptoms   Increase fluids and rest  Follow up if symptoms not improving with treatments

## 2025-03-26 ENCOUNTER — APPOINTMENT (OUTPATIENT)
Dept: GASTROENTEROLOGY | Facility: EXTERNAL LOCATION | Age: 70
End: 2025-03-26
Payer: MEDICARE

## 2025-05-05 DIAGNOSIS — I10 PRIMARY HYPERTENSION: ICD-10-CM

## 2025-05-05 RX ORDER — ASPIRIN 81 MG/1
81 TABLET ORAL DAILY
Qty: 90 TABLET | Refills: 3 | Status: SHIPPED | OUTPATIENT
Start: 2025-05-05 | End: 2025-05-08 | Stop reason: SDUPTHER

## 2025-05-08 DIAGNOSIS — I10 PRIMARY HYPERTENSION: ICD-10-CM

## 2025-05-08 RX ORDER — ASPIRIN 81 MG/1
81 TABLET ORAL DAILY
Qty: 90 TABLET | Refills: 3 | Status: SHIPPED | OUTPATIENT
Start: 2025-05-08

## 2025-05-23 ENCOUNTER — APPOINTMENT (OUTPATIENT)
Dept: GASTROENTEROLOGY | Facility: EXTERNAL LOCATION | Age: 70
End: 2025-05-23
Payer: MEDICARE

## 2025-06-25 ENCOUNTER — APPOINTMENT (OUTPATIENT)
Dept: GASTROENTEROLOGY | Facility: EXTERNAL LOCATION | Age: 70
End: 2025-06-25
Payer: MEDICARE

## 2025-06-25 DIAGNOSIS — K64.8 OTHER HEMORRHOIDS: ICD-10-CM

## 2025-06-25 DIAGNOSIS — K92.1 MELENA: Primary | ICD-10-CM

## 2025-06-25 DIAGNOSIS — K21.9 GASTRO-ESOPHAGEAL REFLUX DISEASE WITHOUT ESOPHAGITIS: ICD-10-CM

## 2025-06-25 DIAGNOSIS — D12.3 BENIGN NEOPLASM OF TRANSVERSE COLON: ICD-10-CM

## 2025-06-25 DIAGNOSIS — D12.5 BENIGN NEOPLASM OF SIGMOID COLON: ICD-10-CM

## 2025-06-25 DIAGNOSIS — K57.30 DIVERTICULOSIS OF LARGE INTESTINE WITHOUT DIVERTICULITIS: ICD-10-CM

## 2025-06-25 DIAGNOSIS — K92.1 BLACK STOOLS: ICD-10-CM

## 2025-06-25 PROCEDURE — 45385 COLONOSCOPY W/LESION REMOVAL: CPT | Performed by: INTERNAL MEDICINE

## 2025-06-25 PROCEDURE — 43239 EGD BIOPSY SINGLE/MULTIPLE: CPT | Performed by: INTERNAL MEDICINE

## 2025-06-26 ENCOUNTER — LAB REQUISITION (OUTPATIENT)
Dept: LAB | Facility: HOSPITAL | Age: 70
End: 2025-06-26
Payer: MEDICARE

## 2025-07-01 ENCOUNTER — APPOINTMENT (OUTPATIENT)
Dept: PRIMARY CARE | Facility: CLINIC | Age: 70
End: 2025-07-01
Payer: MEDICARE

## 2025-07-01 VITALS
SYSTOLIC BLOOD PRESSURE: 124 MMHG | WEIGHT: 176 LBS | BODY MASS INDEX: 30.05 KG/M2 | DIASTOLIC BLOOD PRESSURE: 70 MMHG | TEMPERATURE: 97.4 F | OXYGEN SATURATION: 96 % | HEIGHT: 64 IN | HEART RATE: 76 BPM

## 2025-07-01 DIAGNOSIS — K31.7 POLYP, STOMACH: ICD-10-CM

## 2025-07-01 DIAGNOSIS — K63.5 POLYP OF COLON, UNSPECIFIED PART OF COLON, UNSPECIFIED TYPE: Primary | ICD-10-CM

## 2025-07-01 PROCEDURE — 3008F BODY MASS INDEX DOCD: CPT | Performed by: NURSE PRACTITIONER

## 2025-07-01 PROCEDURE — 1126F AMNT PAIN NOTED NONE PRSNT: CPT | Performed by: NURSE PRACTITIONER

## 2025-07-01 PROCEDURE — 3078F DIAST BP <80 MM HG: CPT | Performed by: NURSE PRACTITIONER

## 2025-07-01 PROCEDURE — 1159F MED LIST DOCD IN RCRD: CPT | Performed by: NURSE PRACTITIONER

## 2025-07-01 PROCEDURE — 99212 OFFICE O/P EST SF 10 MIN: CPT | Performed by: NURSE PRACTITIONER

## 2025-07-01 PROCEDURE — 3074F SYST BP LT 130 MM HG: CPT | Performed by: NURSE PRACTITIONER

## 2025-07-01 ASSESSMENT — PAIN SCALES - GENERAL: PAINLEVEL_OUTOF10: 0-NO PAIN

## 2025-07-01 NOTE — PROGRESS NOTES
"Subjective   Patient ID: Sharon Carbaallo is a 69 y.o. female who presents for Results (EGD/ Colonoscopy +biopsy ).    Following up from recent EGD and colonoscopy 6/25/2025 with Dr. Guardado  EGD and colonoscopy reviewed with patient.  Biopsy results of stomach and colon polyps pending  Patient remains very concerned due to family history of stomach cancer      Review of Systems    Objective     /70 (BP Location: Left arm, Patient Position: Sitting, BP Cuff Size: Large adult)   Pulse 76   Temp 36.3 °C (97.4 °F) (Temporal)   Ht 1.626 m (5' 4\")   Wt 79.8 kg (176 lb)   SpO2 96%   BMI 30.21 kg/m²      Physical Exam  Vitals and nursing note reviewed.   Constitutional:       General: She is not in acute distress.     Appearance: Normal appearance.   Pulmonary:      Effort: Pulmonary effort is normal.       Assessment/Plan   Diagnoses and all orders for this visit:  Polyp of colon, unspecified part of colon, unspecified type  Polyp, stomach        Patient Instructions   Awaiting biopsy results from EGD and colonoscopy 6/25/2025  Will follow up once results received.   "

## 2025-07-08 LAB
LAB AP ASR DISCLAIMER: NORMAL
LABORATORY COMMENT REPORT: NORMAL
PATH REPORT.FINAL DX SPEC: NORMAL
PATH REPORT.GROSS SPEC: NORMAL
PATH REPORT.RELEVANT HX SPEC: NORMAL
PATH REPORT.TOTAL CANCER: NORMAL

## 2025-07-09 ENCOUNTER — TELEPHONE (OUTPATIENT)
Dept: GASTROENTEROLOGY | Facility: CLINIC | Age: 70
End: 2025-07-09
Payer: MEDICARE

## 2025-07-09 ENCOUNTER — TELEPHONE (OUTPATIENT)
Dept: PRIMARY CARE | Facility: CLINIC | Age: 70
End: 2025-07-09
Payer: MEDICARE

## 2025-07-09 DIAGNOSIS — C85.90 LYMPHOMA, UNSPECIFIED BODY REGION, UNSPECIFIED LYMPHOMA TYPE (MULTI): Primary | ICD-10-CM

## 2025-07-09 DIAGNOSIS — C88.40 MALT LYMPHOMA: Primary | ICD-10-CM

## 2025-07-09 NOTE — TELEPHONE ENCOUNTER
Sharon called to discuss the results that were released to my chart from her 6/25 procedures.        Thank You,    Loreta

## 2025-07-09 NOTE — TELEPHONE ENCOUNTER
----- Message from Jes ZAMORA sent at 7/9/2025 11:52 AM EDT -----  I did speak with the patient regarding this message and let her know that she should reach out to Dr. Guardado's office since that was the ordering provider, but she did also want to speak with you/make you aware that the results were in

## 2025-07-09 NOTE — TELEPHONE ENCOUNTER
Spoke with patient.  Dr. Guardado reached out to patient today to discuss results. Was referred to Hematology/Oncology for follow up. States concern of being scheduled but appointment cancelled.   Advised to reach out to Hematology/Oncology tomorrow during office hours to get scheduled.  I will place orders for PET and blood work to complete in the meantime.

## 2025-07-11 ENCOUNTER — HOSPITAL ENCOUNTER (OUTPATIENT)
Dept: RADIOLOGY | Facility: HOSPITAL | Age: 70
Discharge: HOME | End: 2025-07-11
Payer: MEDICARE

## 2025-07-11 ENCOUNTER — TELEPHONE (OUTPATIENT)
Dept: PRIMARY CARE | Facility: CLINIC | Age: 70
End: 2025-07-11
Payer: MEDICARE

## 2025-07-11 DIAGNOSIS — C85.90 LYMPHOMA, UNSPECIFIED BODY REGION, UNSPECIFIED LYMPHOMA TYPE (MULTI): ICD-10-CM

## 2025-07-11 LAB
ALBUMIN SERPL-MCNC: 4.6 G/DL (ref 3.6–5.1)
ALP SERPL-CCNC: 64 U/L (ref 37–153)
ALT SERPL-CCNC: 21 U/L (ref 6–29)
ANION GAP SERPL CALCULATED.4IONS-SCNC: 7 MMOL/L (CALC) (ref 7–17)
AST SERPL-CCNC: 20 U/L (ref 10–35)
BASOPHILS # BLD AUTO: 30 CELLS/UL (ref 0–200)
BASOPHILS NFR BLD AUTO: 0.6 %
BILIRUB SERPL-MCNC: 0.5 MG/DL (ref 0.2–1.2)
BUN SERPL-MCNC: 16 MG/DL (ref 7–25)
CALCIUM SERPL-MCNC: 9.7 MG/DL (ref 8.6–10.4)
CHLORIDE SERPL-SCNC: 103 MMOL/L (ref 98–110)
CO2 SERPL-SCNC: 32 MMOL/L (ref 20–32)
CREAT SERPL-MCNC: 0.95 MG/DL (ref 0.5–1.05)
EGFRCR SERPLBLD CKD-EPI 2021: 66 ML/MIN/1.73M2
EOSINOPHIL # BLD AUTO: 170 CELLS/UL (ref 15–500)
EOSINOPHIL NFR BLD AUTO: 3.4 %
ERYTHROCYTE [DISTWIDTH] IN BLOOD BY AUTOMATED COUNT: 15.8 % (ref 11–15)
GLUCOSE BLD MANUAL STRIP-MCNC: 100 MG/DL (ref 74–99)
GLUCOSE SERPL-MCNC: 76 MG/DL (ref 65–139)
HCT VFR BLD AUTO: 42.6 % (ref 35–45)
HGB BLD-MCNC: 13.6 G/DL (ref 11.7–15.5)
LDH SERPL P TO L-CCNC: 164 U/L (ref 120–250)
LYMPHOCYTES # BLD AUTO: 1715 CELLS/UL (ref 850–3900)
LYMPHOCYTES NFR BLD AUTO: 34.3 %
MCH RBC QN AUTO: 28.5 PG (ref 27–33)
MCHC RBC AUTO-ENTMCNC: 31.9 G/DL (ref 32–36)
MCV RBC AUTO: 89.1 FL (ref 80–100)
MONOCYTES # BLD AUTO: 415 CELLS/UL (ref 200–950)
MONOCYTES NFR BLD AUTO: 8.3 %
NEUTROPHILS # BLD AUTO: 2670 CELLS/UL (ref 1500–7800)
NEUTROPHILS NFR BLD AUTO: 53.4 %
PLATELET # BLD AUTO: 355 THOUSAND/UL (ref 140–400)
PMV BLD REES-ECKER: 10 FL (ref 7.5–12.5)
POTASSIUM SERPL-SCNC: 4.3 MMOL/L (ref 3.5–5.3)
PROT SERPL-MCNC: 7.2 G/DL (ref 6.1–8.1)
RBC # BLD AUTO: 4.78 MILLION/UL (ref 3.8–5.1)
SODIUM SERPL-SCNC: 142 MMOL/L (ref 135–146)
WBC # BLD AUTO: 5 THOUSAND/UL (ref 3.8–10.8)

## 2025-07-11 PROCEDURE — A9552 F18 FDG: HCPCS | Performed by: NURSE PRACTITIONER

## 2025-07-11 PROCEDURE — 78815 PET IMAGE W/CT SKULL-THIGH: CPT | Mod: PI

## 2025-07-11 PROCEDURE — 3430000001 HC RX 343 DIAGNOSTIC RADIOPHARMACEUTICALS: Performed by: NURSE PRACTITIONER

## 2025-07-11 PROCEDURE — 82947 ASSAY GLUCOSE BLOOD QUANT: CPT

## 2025-07-11 RX ORDER — FLUDEOXYGLUCOSE F 18 200 MCI/ML
11.35 INJECTION, SOLUTION INTRAVENOUS
Status: COMPLETED | OUTPATIENT
Start: 2025-07-11 | End: 2025-07-11

## 2025-07-11 RX ADMIN — FLUDEOXYGLUCOSE F 18 11.35 MILLICURIE: 200 INJECTION, SOLUTION INTRAVENOUS at 08:18

## 2025-07-11 NOTE — TELEPHONE ENCOUNTER
Spoke with patient. Advised of normal blood work.   Reviewed PET scan. Advised appears to be localized to stomach. Keep follow up with Heme/Onc as scheduled 7/22.  Follow up with the office with any questions or concerns, new issues.

## 2025-07-14 LAB
ALBUMIN SERPL-MCNC: 4.6 G/DL (ref 3.6–5.1)
ALP SERPL-CCNC: 64 U/L (ref 37–153)
ALT SERPL-CCNC: 21 U/L (ref 6–29)
ANION GAP SERPL CALCULATED.4IONS-SCNC: 7 MMOL/L (CALC) (ref 7–17)
AST SERPL-CCNC: 20 U/L (ref 10–35)
BASOPHILS # BLD AUTO: 30 CELLS/UL (ref 0–200)
BASOPHILS NFR BLD AUTO: 0.6 %
BILIRUB SERPL-MCNC: 0.5 MG/DL (ref 0.2–1.2)
BUN SERPL-MCNC: 16 MG/DL (ref 7–25)
CALCIUM SERPL-MCNC: 9.7 MG/DL (ref 8.6–10.4)
CHLORIDE SERPL-SCNC: 103 MMOL/L (ref 98–110)
CO2 SERPL-SCNC: 32 MMOL/L (ref 20–32)
CREAT SERPL-MCNC: 0.95 MG/DL (ref 0.5–1.05)
EGFRCR SERPLBLD CKD-EPI 2021: 66 ML/MIN/1.73M2
EOSINOPHIL # BLD AUTO: 170 CELLS/UL (ref 15–500)
EOSINOPHIL NFR BLD AUTO: 3.4 %
ERYTHROCYTE [DISTWIDTH] IN BLOOD BY AUTOMATED COUNT: 15.8 % (ref 11–15)
GLUCOSE SERPL-MCNC: 76 MG/DL (ref 65–139)
HCT VFR BLD AUTO: 42.6 % (ref 35–45)
HGB BLD-MCNC: 13.6 G/DL (ref 11.7–15.5)
LAB AP ASR DISCLAIMER: NORMAL
LABORATORY COMMENT REPORT: NORMAL
LDH SERPL P TO L-CCNC: 164 U/L (ref 120–250)
LYMPHOCYTES # BLD AUTO: 1715 CELLS/UL (ref 850–3900)
LYMPHOCYTES NFR BLD AUTO: 34.3 %
MCH RBC QN AUTO: 28.5 PG (ref 27–33)
MCHC RBC AUTO-ENTMCNC: 31.9 G/DL (ref 32–36)
MCV RBC AUTO: 89.1 FL (ref 80–100)
MONOCYTES # BLD AUTO: 415 CELLS/UL (ref 200–950)
MONOCYTES NFR BLD AUTO: 8.3 %
NEUTROPHILS # BLD AUTO: 2670 CELLS/UL (ref 1500–7800)
NEUTROPHILS NFR BLD AUTO: 53.4 %
PATH REPORT.ADDENDUM SPEC: NORMAL
PATH REPORT.FINAL DX SPEC: NORMAL
PATH REPORT.GROSS SPEC: NORMAL
PATH REPORT.RELEVANT HX SPEC: NORMAL
PATH REPORT.TOTAL CANCER: NORMAL
PLATELET # BLD AUTO: 355 THOUSAND/UL (ref 140–400)
PMV BLD REES-ECKER: 10 FL (ref 7.5–12.5)
POTASSIUM SERPL-SCNC: 4.3 MMOL/L (ref 3.5–5.3)
PROT SERPL-MCNC: 7.2 G/DL (ref 6.1–8.1)
RBC # BLD AUTO: 4.78 MILLION/UL (ref 3.8–5.1)
SODIUM SERPL-SCNC: 142 MMOL/L (ref 135–146)
T4 FREE SERPL-MCNC: 1.5 NG/DL (ref 0.8–1.8)
TSH SERPL-ACNC: 0.27 MIU/L (ref 0.4–4.5)
WBC # BLD AUTO: 5 THOUSAND/UL (ref 3.8–10.8)

## 2025-07-16 ENCOUNTER — APPOINTMENT (OUTPATIENT)
Dept: HEMATOLOGY/ONCOLOGY | Facility: HOSPITAL | Age: 70
End: 2025-07-16
Payer: MEDICARE

## 2025-07-22 ENCOUNTER — PATIENT OUTREACH (OUTPATIENT)
Dept: HEMATOLOGY/ONCOLOGY | Facility: HOSPITAL | Age: 70
End: 2025-07-22
Payer: MEDICARE

## 2025-07-22 ENCOUNTER — LAB (OUTPATIENT)
Dept: LAB | Facility: HOSPITAL | Age: 70
End: 2025-07-22
Payer: MEDICARE

## 2025-07-22 ENCOUNTER — OFFICE VISIT (OUTPATIENT)
Dept: HEMATOLOGY/ONCOLOGY | Facility: HOSPITAL | Age: 70
End: 2025-07-22
Payer: MEDICARE

## 2025-07-22 VITALS
SYSTOLIC BLOOD PRESSURE: 142 MMHG | HEART RATE: 74 BPM | DIASTOLIC BLOOD PRESSURE: 71 MMHG | OXYGEN SATURATION: 98 % | WEIGHT: 174.38 LBS | TEMPERATURE: 98 F | BODY MASS INDEX: 29.93 KG/M2

## 2025-07-22 DIAGNOSIS — C88.40 MALT LYMPHOMA: Primary | ICD-10-CM

## 2025-07-22 DIAGNOSIS — C88.40 MALT LYMPHOMA: ICD-10-CM

## 2025-07-22 LAB
B2 MICROGLOB SERPL-MCNC: 1.6 MG/L (ref 0.7–2.2)
HBV CORE AB SER QL: NONREACTIVE
HBV CORE IGM SER QL: NONREACTIVE
HBV SURFACE AB SER-ACNC: <3.1 MIU/ML
HBV SURFACE AG SERPL QL IA: NONREACTIVE
HCV AB SER QL: NONREACTIVE
HIV 1+2 AB+HIV1 P24 AG SERPL QL IA: NONREACTIVE
IGA SERPL-MCNC: 119 MG/DL (ref 70–400)
IGG SERPL-MCNC: 1040 MG/DL (ref 700–1600)
IGM SERPL-MCNC: 310 MG/DL (ref 40–230)
PROT SERPL-MCNC: 7.5 G/DL (ref 6.4–8.2)

## 2025-07-22 PROCEDURE — 86706 HEP B SURFACE ANTIBODY: CPT

## 2025-07-22 PROCEDURE — 84155 ASSAY OF PROTEIN SERUM: CPT

## 2025-07-22 PROCEDURE — 82784 ASSAY IGA/IGD/IGG/IGM EACH: CPT

## 2025-07-22 PROCEDURE — 87389 HIV-1 AG W/HIV-1&-2 AB AG IA: CPT

## 2025-07-22 PROCEDURE — 86705 HEP B CORE ANTIBODY IGM: CPT

## 2025-07-22 PROCEDURE — 84165 PROTEIN E-PHORESIS SERUM: CPT

## 2025-07-22 PROCEDURE — 86803 HEPATITIS C AB TEST: CPT

## 2025-07-22 PROCEDURE — 87340 HEPATITIS B SURFACE AG IA: CPT

## 2025-07-22 PROCEDURE — 82232 ASSAY OF BETA-2 PROTEIN: CPT

## 2025-07-22 PROCEDURE — 36415 COLL VENOUS BLD VENIPUNCTURE: CPT

## 2025-07-22 PROCEDURE — 86704 HEP B CORE ANTIBODY TOTAL: CPT

## 2025-07-22 SDOH — ECONOMIC STABILITY: GENERAL
WHICH OF THE FOLLOWING WOULD YOU LIKE TO GET CONNECTED TO IN ORDER TO RECEIVE A DISCOUNT OR FOR FREE? (CHOOSE ALL THAT APPLY): PATIENT DECLINED

## 2025-07-22 SDOH — ECONOMIC STABILITY: FOOD INSECURITY: WITHIN THE PAST 12 MONTHS, YOU WORRIED THAT YOUR FOOD WOULD RUN OUT BEFORE YOU GOT MONEY TO BUY MORE.: NEVER TRUE

## 2025-07-22 SDOH — ECONOMIC STABILITY: INCOME INSECURITY: IN THE LAST 12 MONTHS, WAS THERE A TIME WHEN YOU WERE NOT ABLE TO PAY THE MORTGAGE OR RENT ON TIME?: NO

## 2025-07-22 SDOH — HEALTH STABILITY: PHYSICAL HEALTH: ON AVERAGE, HOW MANY DAYS PER WEEK DO YOU ENGAGE IN MODERATE TO STRENUOUS EXERCISE (LIKE A BRISK WALK)?: 7 DAYS

## 2025-07-22 SDOH — ECONOMIC STABILITY: FOOD INSECURITY: WITHIN THE PAST 12 MONTHS, THE FOOD YOU BOUGHT JUST DIDN'T LAST AND YOU DIDN'T HAVE MONEY TO GET MORE.: NEVER TRUE

## 2025-07-22 SDOH — HEALTH STABILITY: PHYSICAL HEALTH: ON AVERAGE, HOW MANY MINUTES DO YOU ENGAGE IN EXERCISE AT THIS LEVEL?: 20 MIN

## 2025-07-22 SDOH — ECONOMIC STABILITY: TRANSPORTATION INSECURITY
IN THE PAST 12 MONTHS, HAS THE LACK OF TRANSPORTATION KEPT YOU FROM MEDICAL APPOINTMENTS OR FROM GETTING MEDICATIONS?: NO

## 2025-07-22 SDOH — ECONOMIC STABILITY: GENERAL
WHICH OF THE FOLLOWING DO YOU KNOW HOW TO USE AND HAVE ACCESS TO EVERY DAY? (CHOOSE ALL THAT APPLY): DESKTOP COMPUTER, LAPTOP COMPUTER, OR TABLET WITH BROADBAND INTERNET CONNECTION;SMARTPHONE WITH CELLULAR DATA PLAN

## 2025-07-22 SDOH — ECONOMIC STABILITY: TRANSPORTATION INSECURITY
IN THE PAST 12 MONTHS, HAS LACK OF TRANSPORTATION KEPT YOU FROM MEETINGS, WORK, OR FROM GETTING THINGS NEEDED FOR DAILY LIVING?: NO

## 2025-07-22 ASSESSMENT — LIFESTYLE VARIABLES
HOW OFTEN DO YOU HAVE A DRINK CONTAINING ALCOHOL: MONTHLY OR LESS
HOW MANY STANDARD DRINKS CONTAINING ALCOHOL DO YOU HAVE ON A TYPICAL DAY: 1 OR 2
AUDIT-C TOTAL SCORE: 2
HOW OFTEN DO YOU HAVE SIX OR MORE DRINKS ON ONE OCCASION: LESS THAN MONTHLY
SKIP TO QUESTIONS 9-10: 0

## 2025-07-22 ASSESSMENT — SOCIAL DETERMINANTS OF HEALTH (SDOH)
WITHIN THE LAST YEAR, HAVE YOU BEEN KICKED, HIT, SLAPPED, OR OTHERWISE PHYSICALLY HURT BY YOUR PARTNER OR EX-PARTNER?: NO
HOW HARD IS IT FOR YOU TO PAY FOR THE VERY BASICS LIKE FOOD, HOUSING, MEDICAL CARE, AND HEATING?: NOT HARD AT ALL
DO YOU BELONG TO ANY CLUBS OR ORGANIZATIONS SUCH AS CHURCH GROUPS UNIONS, FRATERNAL OR ATHLETIC GROUPS, OR SCHOOL GROUPS?: YES
IN THE PAST 12 MONTHS, HAS THE ELECTRIC, GAS, OIL, OR WATER COMPANY THREATENED TO SHUT OFF SERVICE IN YOUR HOME?: NO
WITHIN THE LAST YEAR, HAVE TO BEEN RAPED OR FORCED TO HAVE ANY KIND OF SEXUAL ACTIVITY BY YOUR PARTNER OR EX-PARTNER?: NO
WITHIN THE LAST YEAR, HAVE YOU BEEN HUMILIATED OR EMOTIONALLY ABUSED IN OTHER WAYS BY YOUR PARTNER OR EX-PARTNER?: NO
IN A TYPICAL WEEK, HOW MANY TIMES DO YOU TALK ON THE PHONE WITH FAMILY, FRIENDS, OR NEIGHBORS?: MORE THAN THREE TIMES A WEEK
HOW OFTEN DO YOU GET TOGETHER WITH FRIENDS OR RELATIVES?: ONCE A WEEK
HOW OFTEN DO YOU ATTEND CHURCH OR RELIGIOUS SERVICES?: MORE THAN 4 TIMES PER YEAR
HOW OFTEN DO YOU ATTENT MEETINGS OF THE CLUB OR ORGANIZATION YOU BELONG TO?: MORE THAN 4 TIMES PER YEAR
WITHIN THE LAST YEAR, HAVE YOU BEEN AFRAID OF YOUR PARTNER OR EX-PARTNER?: NO

## 2025-07-22 ASSESSMENT — PAIN SCALES - GENERAL: PAINLEVEL_OUTOF10: 0-NO PAIN

## 2025-07-22 NOTE — PROGRESS NOTES
Patient ID: Sharon Caraballo is a 69 y.o. female.  Date of Service: 07/21/25  Referring Physician: Jayjay Guardado MD  [address] [facility], [address]  [place] [id number]  Primary Care Provider: PAULA Whitfield-GONZALO      Subjective    Oncology History    No history exists.       HPI  Ms. Caraballo is a 70yo F w PMHx HTN, HLD, COPD, GERD, non-toxic goiter who was referred to malignant hematology clinic in July of 2025 after biopsy of a stomach lesion disclosed small B cell lymphoma. In February of 2025, she presented to PCP for 5-6 days of melena with bloating and some heartburn despite taking PPI. She was referred to GI and underwent colonoscopy and EGD on 6/25/25. Colonoscopy found 3 sessile polyps that were removed, diverticulosis, and hemorrhoids. EGD found large infiltrative polypoid mass of the greater curvature of stomach, which was biopsied, showing small lymphocyte lymphoma. Subsequent PET/CT on 7/9/25 demonstrated mild uptake at the site of the tumor, along with a R cervical LN, considered non-specific.     Today, pt reports feeling well and has no complaints. She states she had some intermittent epigastric pain and melena, perhaps ongoing for a year. Her symptoms were random and not associated with food intake, but were exacerbated by heavy lifting. Today, her symptoms have completely resolved. She is currently taking pantoprazole once daily.     ROS:  Gen: no fevers, chills, weight loss, +fatigue, +night sweats  Eyes: +blurred vision (chronic), no eye irritation  HENT: no sore throat, +intermittent runny nose, no hearing loss, no lumps in neck  Heart: no chest pain, palpitations, swelling in extremities  Lungs: +chronic SOB, no cough  GI: no abdominal pain, N/V, + non-bloody diarrhea few days ago, constipation, blood in stool  : no pain on urination, no blood in urine, no genital discharge, no flank pain  MSK: no pain in joints or soft tissues, no restriction in ROM  Neuro: no  "headaches, no seizures, no numbness/tingling, no loss of strength, no dizziness  Skin: no rashes, no lesions  Psych: no mood change, no anxiety, no hallucinations    Allergies: NKDA    Shx: Former tobacco use (25 years x 1 ppd), active alcohol use (wine, vodka), no recreational drugs. Lives with  and has a pet dog.     Fhx: 1 sister passed from stomach cancer, 1 sister passed from ovarian cancer, 2 half sisters with breast cancer    Surg Hx: hysterectomy    6/25/25 EGD:    FINAL DIAGNOSIS   A.  Stomach, biopsies:  -Small B-cell lymphoma with plasmacytic differentiation (see note)     B.  Transverse colon, polypectomy:  - Sessile serrated adenoma.     C.  Sigmoid colon, polypectomies:  - Hyperplastic polyps.     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  There is no evidence of a large cell transformation.  An H. pylori immunohistochemical stain was inadvertently omitted from the original stain list, and results will be reported in an addendum.  A hematology consult is recommended.  Correlation with radiologic imaging is also recommended to confirm this is a solitary location of involvement.     Immunohistochemical stains:  Immunohistochemical stains are performed, with appropriate controls.  CD3 highlights small, reactive T cells present.  CD5 matches the CD3 positive T-cell distribution.  CD20 highlights markedly increased numbers of small B cells, which coexpress BCL2, CD43 and lacks CD5, cyclin D1, CD23, CD10, BCL6.  CD23 and CD21 highlights a markedly disrupted and colonized follicular dendritic cell meshwork.  highlights increased plasma cells.  The lesional cells of interest are kappa light chain restricted, and there are scattered lambda positive plasma cells.  Mum 1 " highlights plasma cells and is negative in the small lymphocytes.     PET/CT 7/11/25:    IMPRESSION:  Mild-to-moderate hypermetabolic tracer activity appearing to be along  the greater curvature/pyloric antrum of the stomach. Finding is  nonspecific, but given this patient's history, may correspond to  patient's reported lymphomatous process. Localizing images were sent  to PACS.      Mild hypermetabolic tracer activity is noted corresponding to a right  level 2 cervical lymph node. Finding is nonspecific and may be  reactive. However, continued attention on future exams is recommended.      Heterogeneous activity is noted within the visualized bowel,  particularly in the region of the cecum/ascending colon. This finding  is typically nonspecific, but continued attention on future exams may  be of value.    Problem List[1]  Medical History[2]  Surgical History[3]  Family History[4]  Current Medications[5]      Objective   BSA: There is no height or weight on file to calculate BSA.  There were no vitals taken for this visit.    Performance Status:  Asymptomatic    Physical Exam  Gen: well-developed woman in no acute distress  Eyes: anicteric, no injection, no discharge, pupils equal and reactive  HENT: NCAT, no discharge from orifices, oral mucosa moist, dentition adequate, no masses in neck  Lungs: normal effort on RA  Abdo: non-distended  MSK: no deformities of soft tissues or joints  Neuro: Aox4  Skin: warm, dry, and intact throughout  Psych: appropriate mood and affect, no phobias/delusions/hallucinations    === 11/07/24 ===    CT LUNG SCREENING LOW DOSE    - Impression -  1. There are no suspicious lung nodules. Severe emphysematous  changes.. Continued screening with low-dose noncontrast chest CT in  12 months (from current date) is recommended.  2. Estimated coronary artery calcium score is 600* which correlates  with at least 96th percentile rank as compared to matched  PRADHAN-study  subjects([email or web address]://[email or web address]-[email or web address]/Calcium/input.aspx).  3. Severe thyromegaly and correlate with thyroid function.    LUNG RADS CATEGORY:  Lung Rad: Lung-RADS 1, S (Negative)    Recommendation: Continue annual screening with Low Dose Chest CT in  12 months, recommended as per American College of Radiology  Guidelines Lung-RADS Version 2022. Lung-RADS S (Other non-nodular  findings) Management as appropriate to finding per American College  of Radiology Guidelines Lung-RADS Version 2022.        *The patient's CAC score was measured with an FDA-cleared AI tool  that correlates well with traditional methods. However, due to the  non-gated CT scan and new algorithm, AI-powered scores should not  replace traditional cardiovascular risk assessment. For further  assistance, refer to the [facility] Cardiovascular Prevention  Program via an [employer] referral to 'Cardiology Prevention Program.'    MACRO:  None    Signed by: Jorge Luis Sher 11/9/2024 6:55 AM  Dictation workstation:   [id number]    Assessment/Plan       70yo F w PMHx HTN, HLD, COPD, GERD, non-toxic goiter who was referred to malignant hematology clinic in July of 2025 after biopsy of a stomach lesion disclosed small B cell lymphoma. Based on the presentation and histologic characteristics, which indicate CD20+, CD5-, CD10-, BCL2-, BCL6-, CCND1-, this appears likely to be extranodal/MALT marginal zone lymphoma, though LPL and some other entities are possible. PET/CT shows disease likely localized to stomach, with single R cervical node of unclear significance, which is likely Lugano stage I disease, II at most.     H pylori status needs to be confirmed by retesting. Given the pt has already had GI bleeding complications, treatment will likely be necessary, either with H pylori-directed therapy followed by confirmation of cure, or if negative, radiation therapy +/- rituximab. Will discuss patient at tumor board this week.     #Extranodal MALT  lymphoma  -H pylori negative on EGD biopsy  -complications: GI bleeding, fatigue  -Lugano stage: I (stomach involvement only)  -MALT-IPI: 0 pts -> 96.7% 5yr survival    Plan:  -urease breath test and H pylori stool Ag  -tumor board for discussion this Thursday, including to test cytogenetics/NGS especially for t(11;18)   -B2M  -SPEP, qIg to assess for IgM paraprotein (LPL)  -HBV sAg, sAb, core total, core IgM, HCV serology, HIV serology  -based on above testing, will consider referral to radiation oncology and consent for rituximab at next visit    RTC in 2 weeks    Patient seen and discussed with Dr. Marie, who agrees with the plan.    Omega Wu MD, PhD  Hem/onc fellow    Plan was discussed in detail with the patient and patient was in agreement with the plan of care.       Omega Wu MD PhD                                [1]   Patient Active Problem List  Diagnosis    History of prolapse of bladder    Emphysema/COPD (Multi)    Goiter diffuse, nontoxic    Hearing difficulty of left ear    Mixed hyperlipidemia    Prediabetes    Urge and stress incontinence    Coronary artery calcification    Coronary atherosclerosis    Primary hypertension    Cough    Impacted cerumen of both ears    Otalgia of left ear    Gastroesophageal reflux disease without esophagitis   [2]   Past Medical History:  Diagnosis Date    Hypertension     Personal history of other diseases of the nervous system and sense organs 05/04/2021    History of acute conjunctivitis    Shortness of breath 03/19/2021    SOB (shortness of breath) on exertion   [3]   Past Surgical History:  Procedure Laterality Date    OOPHORECTOMY  10/10/2000    OTHER SURGICAL HISTORY  05/13/2022    Hysterectomy   [4]   Family History  Problem Relation Name Age of Onset    Ovarian cancer Sister      Breast cancer Sister      Breast cancer Sister      Hypertension Mother Gaston hannah     Stroke Mother Gaston hannah     Cancer Sister Baby sister     Cancer Sister  Big sister     Thyroid disease Sister Galina Kenyon    [5]   Current Outpatient Medications:     albuterol 90 mcg/actuation inhaler, Inhale 2 puffs every 4 hours if needed for wheezing., Disp: 18 g, Rfl: 0    aspirin 81 mg EC tablet, Take 1 tablet (81 mg) by mouth once daily., Disp: 90 tablet, Rfl: 3    atorvastatin (Lipitor) 80 mg tablet, Take 1 tablet (80 mg) by mouth once daily., Disp: 100 tablet, Rfl: 3    cholecalciferol (Vitamin D-3) 5,000 Units tablet, Take 1 tablet (125 mcg) by mouth once daily., Disp: , Rfl:     hydroCHLOROthiazide (HYDRODiuril) 25 mg tablet, Take 1 (ONE) tablet (25 mg) by mouth once daily, Disp: 90 tablet, Rfl: 1    pantoprazole (ProtoNix) 40 mg EC tablet, Take 1 tablet (40 mg) by mouth 2 times a day., Disp: 180 tablet, Rfl: 0    telmisartan (MIcarDIS) 40 mg tablet, Take 1 tablet (40 mg) by mouth once daily., Disp: 30 tablet, Rfl: 11

## 2025-07-22 NOTE — PROGRESS NOTES
Learner: family and patient  Educated on: Diagnosis  Readiness: acceptance  Preferred learning method: preferred: reading, observing, and listening  Method used: explanation and handout  Response: demonstrated understanding  Barriers: None  Preferred language: English  Resources given: Provided new patient portfolio, how to reach your team handout and new patient guide. Suggested patient put [facility] main number and MD name in their phone, for easy access.

## 2025-07-23 NOTE — TUMOR BOARD NOTE
"TUMOR BOARD DISCUSSION SUMMARY    PRESENTER: Dr. Omega Wu     DIAGNOSIS: small B cell lymphoma     SUMMARY/PRESENTATION: Sharon Caraballo is a 69 y.o. female patient who presents with biopsy of a stomach lesion showing small B cell lymphoma      History: HTN, HLD, COPD, GERD       Information reviewed: Radiology Review and Pathology Review    Radiology:   PET CT 07/11/25  IMPRESSION:  Mild-to-moderate hypermetabolic tracer activity appearing to be along  the greater curvature/pyloric antrum of the stomach. Finding is  nonspecific, but given this patient's history, may correspond to  patient's reported lymphomatous process. Localizing images were sent  to PACS.      Mild hypermetabolic tracer activity is noted corresponding to a right  level 2 cervical lymph node. Finding is nonspecific and may be  reactive. However, continued attention on future exams is recommended.      Heterogeneous activity is noted within the visualized bowel,  particularly in the region of the cecum/ascending colon. This finding  is typically nonspecific, but continued attention on future exams may  be of value.    Pathology:   06/25/25  A.  Stomach, biopsies:  -Small B-cell lymphoma with plasmacytic differentiation (see note)     B.  Transverse colon, polypectomy:  - Sessile serrated adenoma.     C.  Sigmoid colon, polypectomies:  - Hyperplastic polyps.     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  There is no evidence of a large cell transformation.  An H. pylori immunohistochemical stain was inadvertently omitted from the original stain list, and results will be reported in an addendum.  A hematology consult is recommended.  Correlation with radiologic imaging is also recommended to confirm this is a solitary " location of involvement.     Immunohistochemical stains:  Immunohistochemical stains are performed, with appropriate controls.  CD3 highlights small, reactive T cells present.  CD5 matches the CD3 positive T-cell distribution.  CD20 highlights markedly increased numbers of small B cells, which coexpress BCL2, CD43 and lacks CD5, cyclin D1, CD23, CD10, BCL6.  CD23 and CD21 highlights a markedly disrupted and colonized follicular dendritic cell meshwork.  highlights increased plasma cells.  The lesional cells of interest are kappa light chain restricted, and there are scattered lambda positive plasma cells.  Mum 1 highlights plasma cells and is negative in the small lymphocytes.    Additional immunostain:     H. Pylori: negative    Colonoscopy : negative for pathology in cecum.    RECOMMENDATIONS: Await for serum antibodies to H pylori and FISH for t (11,18) to determine likely response to triple antibiotic therapy.            Disclaimer     [facility] tumor board recommendations represent the consensus opinion of physicians present at a weekly patient care conference. The treating [facility] physician is not always present, and many of the physicians formulating the recommendation have not personally seen or examined the patient under discussion. It is understood that the treating [facility] physician considers the expertise of the Tumor Board Recommendation in formulating his/her plan for the patient. However, in many situations, based on individualized patient considerations, a different plan is determined by the treating physician to be the optimal medical management.     Scribe Attestation  By signing my name below, Martir GARNETT Scribe   attest that this documentation has been prepared under the direction and in the presence of MALIGNA[facility]EME TUMOR BOARD.

## 2025-07-24 ENCOUNTER — LAB (OUTPATIENT)
Dept: LAB | Facility: HOSPITAL | Age: 70
End: 2025-07-24
Payer: MEDICARE

## 2025-07-24 ENCOUNTER — TUMOR BOARD CONFERENCE (OUTPATIENT)
Dept: HEMATOLOGY/ONCOLOGY | Facility: HOSPITAL | Age: 70
End: 2025-07-24
Payer: MEDICARE

## 2025-07-24 DIAGNOSIS — C88.40 MALT LYMPHOMA: ICD-10-CM

## 2025-07-24 LAB
ALBUMIN: 4.6 G/DL (ref 3.4–5)
ALPHA 1 GLOBULIN: 0.3 G/DL (ref 0.2–0.6)
ALPHA 2 GLOBULIN: 0.6 G/DL (ref 0.4–1.1)
BETA GLOBULIN: 0.8 G/DL (ref 0.5–1.2)
GAMMA GLOBULIN: 1.2 G/DL (ref 0.5–1.4)
PATH REVIEW-SERUM PROTEIN ELECTROPHORESIS: NORMAL
PROTEIN ELECTROPHORESIS COMMENT: NORMAL

## 2025-07-24 PROCEDURE — 83013 H PYLORI (C-13) BREATH: CPT

## 2025-07-24 PROCEDURE — 87449 NOS EACH ORGANISM AG IA: CPT

## 2025-07-26 LAB — H PYLORI AG STL QL IA: NEGATIVE

## 2025-07-30 LAB — SCAN RESULT: NORMAL

## 2025-07-31 ENCOUNTER — TELEPHONE (OUTPATIENT)
Dept: HEMATOLOGY/ONCOLOGY | Facility: HOSPITAL | Age: 70
End: 2025-07-31
Payer: MEDICARE

## 2025-07-31 NOTE — TELEPHONE ENCOUNTER
Call made to patient   Left message on identified vmail, encouraged patient to call office if she would like to discuss further.     Dr. Wu is unable to completed bmbx at visit on 8/5, due to tech availability. Encouraged patient to keep the 8/13 bone marrow biopsy. Provider can also discuss further with patient at visit.

## 2025-08-01 ENCOUNTER — TELEPHONE (OUTPATIENT)
Dept: ADMISSION | Facility: HOSPITAL | Age: 70
End: 2025-08-01
Payer: MEDICARE

## 2025-08-01 NOTE — TELEPHONE ENCOUNTER
Call made to patient left message.   At this time there isnt any bone marrow biopsy appts earlier than 8/7. This issue can be discussed/ looked into at visit 8/5/2025; it is not uncommon to have openings occur due to cancellations or something.   
Pt called regarding her bone marrow biopsy.  She is currently scheduled for a visit with Dr. Wu on 8/5 and bone marrow biopsy on 8/13.  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  
Moderna dose 1, 2, and 3

## 2025-08-05 ENCOUNTER — OFFICE VISIT (OUTPATIENT)
Dept: HEMATOLOGY/ONCOLOGY | Facility: HOSPITAL | Age: 70
End: 2025-08-05
Payer: MEDICARE

## 2025-08-05 VITALS
BODY MASS INDEX: 30.31 KG/M2 | OXYGEN SATURATION: 98 % | TEMPERATURE: 97 F | HEART RATE: 64 BPM | SYSTOLIC BLOOD PRESSURE: 173 MMHG | DIASTOLIC BLOOD PRESSURE: 72 MMHG | WEIGHT: 176.6 LBS

## 2025-08-05 DIAGNOSIS — C88.40 MALT LYMPHOMA: Primary | ICD-10-CM

## 2025-08-05 LAB
ELECTRONICALLY SIGNED BY: NORMAL
MYD88 L265P INTERPRETATION: NORMAL
MYD88 L265P MUTATION RESULTS: NOT DETECTED

## 2025-08-05 ASSESSMENT — PAIN SCALES - GENERAL: PAINLEVEL_OUTOF10: 0-NO PAIN

## 2025-08-05 NOTE — PROGRESS NOTES
HEMATOLOGY/ONCOLOGY FOLLOW UP NOTE    Dx: MALT Lymphoma    RECENT EVENTS:    Pt has been scheduled for bone marrow biopsy 8/13.    SUBJECTIVE:    Patient reports feeling well. She has no symptoms except for some bloating or discomfort in the upper abdomen, which is very mild. She has no fevers, chills, weight loss, hematemesis, hematochezia, or melena. She does report some mild hot flashes. She does reports some anxiety about the bone marrow biopsy procedure.     OBJECTIVE:  /72 (BP Location: Right arm, Patient Position: Sitting, BP Cuff Size: Adult)   Pulse 64   Temp 36.1 °C (97 °F) (Temporal)   Wt 80.1 kg (176 lb 9.6 oz)   SpO2 98%   BMI 30.31 kg/m²     Physical Exam:    Gen: well-developed woman in no acute distress  Eyes: anicteric, no injection, no discharge, pupils equal and reactive  HENT: NCAT, no discharge from orifices, oral mucosa moist, dentition adequate, no masses in neck  Heart: RRR, no m/r/g, no JVD, no pitting edema in BL Les  Lungs: CTAB with vesicular breath sounds, no rhonchi/rales/wheezes/crackles, normal effort on RA  Abdo: soft, non-distended, no TTP, no masses or organomegaly  MSK: no deformities of soft tissues or joints  Neuro: Aox4  Skin: warm, dry, and intact throughout  Psych: appropriate mood and affect, no phobias/delusions/hallucinations    CURRENT MEDICATIONS:  Current Medications[1]     LAB DATA:    H pylori from stomach biopsy: negative  H pylori antigen from stool: negative  M protein: negative  IgM: mild elevated at 310  IgG/IgA: negative    NEW IMAGING/OTHER:    None    IMPRESSION    70yo F w PMHx HTN, HLD, COPD, GERD, non-toxic goiter who was referred to malignant hematology clinic in July of 2025 after biopsy of a stomach lesion disclosed small B cell lymphoma. Based on the presentation and histologic characteristics, which indicate CD20+, CD5-, CD10-, BCL2-, BCL6-, CCND1-, this appears likely to be extranodal/MALT marginal zone lymphoma, though LPL and some other  entities are possible. PET/CT shows disease likely localized to stomach, with single R cervical node of unclear significance, which is likely Lugano stage I disease, II at most. H pylori status has been negative on two separate tests (stomach biopsy, stool antigen), and t(11;18) and MYD88 genetic testing still pending. Pt has been recommended for BM biopsy and will be obtaining this to rule out systemic involvement on 8/13.      #Extranodal MALT lymphoma  -H pylori negative on EGD biopsy, stool Ag  -complications: GI bleeding, fatigue -> both resolved  -Lugano stage: I (stomach involvement only), but pending BM evaluation  -MALT-IPI: 0 pts -> 96.7% 5yr survival     Plan:  -pending t(11;18) and MYD88 translocation analyses on gastric biopsies  -BM biopsy on 8/13  -referral to radiation oncology with Dr. Fields  -based on above testing, will consider referral to radiation oncology and consent for rituximab at next visit     RTC in 2 weeks     Patient seen and discussed with Dr. Marie, who agrees with the plan.    Omega Wu MD, PhD  Hem/onc fellow           [1]   Current Outpatient Medications:     aspirin 81 mg EC tablet, Take 1 tablet (81 mg) by mouth once daily., Disp: 90 tablet, Rfl: 3    atorvastatin (Lipitor) 80 mg tablet, Take 1 tablet (80 mg) by mouth once daily., Disp: 100 tablet, Rfl: 3    cholecalciferol (Vitamin D-3) 5,000 Units tablet, Take 1 tablet (125 mcg) by mouth once daily., Disp: , Rfl:     hydroCHLOROthiazide (HYDRODiuril) 25 mg tablet, Take 1 (ONE) tablet (25 mg) by mouth once daily, Disp: 90 tablet, Rfl: 1    albuterol 90 mcg/actuation inhaler, Inhale 2 puffs every 4 hours if needed for wheezing., Disp: 18 g, Rfl: 0    pantoprazole (ProtoNix) 40 mg EC tablet, Take 1 tablet (40 mg) by mouth 2 times a day., Disp: 180 tablet, Rfl: 0    telmisartan (MIcarDIS) 40 mg tablet, Take 1 tablet (40 mg) by mouth once daily., Disp: 30 tablet, Rfl: 11

## 2025-08-08 LAB — SCAN RESULT: NORMAL

## 2025-08-13 ENCOUNTER — APPOINTMENT (OUTPATIENT)
Dept: LAB | Facility: HOSPITAL | Age: 70
End: 2025-08-13
Payer: MEDICARE

## 2025-08-13 ENCOUNTER — PROCEDURE VISIT (OUTPATIENT)
Dept: HEMATOLOGY/ONCOLOGY | Facility: HOSPITAL | Age: 70
End: 2025-08-13
Payer: MEDICARE

## 2025-08-13 ENCOUNTER — TELEPHONE (OUTPATIENT)
Dept: RADIATION ONCOLOGY | Facility: HOSPITAL | Age: 70
End: 2025-08-13
Payer: MEDICARE

## 2025-08-13 VITALS
TEMPERATURE: 97.9 F | WEIGHT: 176.57 LBS | OXYGEN SATURATION: 100 % | DIASTOLIC BLOOD PRESSURE: 61 MMHG | BODY MASS INDEX: 30.31 KG/M2 | HEART RATE: 71 BPM | RESPIRATION RATE: 19 BRPM | SYSTOLIC BLOOD PRESSURE: 140 MMHG

## 2025-08-13 DIAGNOSIS — C88.40 MALT LYMPHOMA: ICD-10-CM

## 2025-08-13 LAB
BASOPHILS # BLD AUTO: 0.03 X10*3/UL (ref 0–0.1)
BASOPHILS NFR BLD AUTO: 0.7 %
EOSINOPHIL # BLD AUTO: 0.15 X10*3/UL (ref 0–0.7)
EOSINOPHIL NFR BLD AUTO: 3.7 %
ERYTHROCYTE [DISTWIDTH] IN BLOOD BY AUTOMATED COUNT: 15.3 % (ref 11.5–14.5)
HCT VFR BLD AUTO: 38.8 % (ref 36–46)
HGB BLD-MCNC: 12.9 G/DL (ref 12–16)
IMM GRANULOCYTES # BLD AUTO: 0.01 X10*3/UL (ref 0–0.7)
IMM GRANULOCYTES NFR BLD AUTO: 0.2 % (ref 0–0.9)
LYMPHOCYTES # BLD AUTO: 1.64 X10*3/UL (ref 1.2–4.8)
LYMPHOCYTES NFR BLD AUTO: 40.3 %
MCH RBC QN AUTO: 29.4 PG (ref 26–34)
MCHC RBC AUTO-ENTMCNC: 33.2 G/DL (ref 32–36)
MCV RBC AUTO: 88 FL (ref 80–100)
MONOCYTES # BLD AUTO: 0.43 X10*3/UL (ref 0.1–1)
MONOCYTES NFR BLD AUTO: 10.6 %
NEUTROPHILS # BLD AUTO: 1.81 X10*3/UL (ref 1.2–7.7)
NEUTROPHILS NFR BLD AUTO: 44.5 %
NRBC BLD-RTO: 0 /100 WBCS (ref 0–0)
PLATELET # BLD AUTO: 320 X10*3/UL (ref 150–450)
RBC # BLD AUTO: 4.39 X10*6/UL (ref 4–5.2)
WBC # BLD AUTO: 4.1 X10*3/UL (ref 4.4–11.3)

## 2025-08-13 PROCEDURE — 38222 DX BONE MARROW BX & ASPIR: CPT | Mod: LT | Performed by: NURSE PRACTITIONER

## 2025-08-13 PROCEDURE — 85025 COMPLETE CBC W/AUTO DIFF WBC: CPT

## 2025-08-13 PROCEDURE — 2500000001 HC RX 250 WO HCPCS SELF ADMINISTERED DRUGS (ALT 637 FOR MEDICARE OP): Performed by: NURSE PRACTITIONER

## 2025-08-13 PROCEDURE — 36415 COLL VENOUS BLD VENIPUNCTURE: CPT

## 2025-08-13 PROCEDURE — 88185 FLOWCYTOMETRY/TC ADD-ON: CPT | Mod: TC

## 2025-08-13 RX ORDER — LORAZEPAM 1 MG/1
0.5 TABLET ORAL ONCE
Status: COMPLETED | OUTPATIENT
Start: 2025-08-13 | End: 2025-08-13

## 2025-08-13 RX ADMIN — LORAZEPAM 0.5 MG: 1 TABLET ORAL at 12:44

## 2025-08-13 ASSESSMENT — PAIN SCALES - GENERAL: PAINLEVEL_OUTOF10: 0-NO PAIN

## 2025-08-14 ENCOUNTER — HOSPITAL ENCOUNTER (OUTPATIENT)
Dept: RADIATION ONCOLOGY | Facility: HOSPITAL | Age: 70
Setting detail: RADIATION/ONCOLOGY SERIES
Discharge: HOME | End: 2025-08-14
Payer: MEDICARE

## 2025-08-14 VITALS
BODY MASS INDEX: 30.32 KG/M2 | WEIGHT: 177.58 LBS | TEMPERATURE: 99 F | RESPIRATION RATE: 18 BRPM | SYSTOLIC BLOOD PRESSURE: 177 MMHG | DIASTOLIC BLOOD PRESSURE: 72 MMHG | HEIGHT: 64 IN | HEART RATE: 71 BPM | OXYGEN SATURATION: 96 %

## 2025-08-14 DIAGNOSIS — C88.40 MALT LYMPHOMA: Primary | ICD-10-CM

## 2025-08-14 LAB
CELL COUNT (BLOOD): 3.69 X10*3/UL
CELL POPULATIONS: NORMAL
DIAGNOSIS: NORMAL
FLOW DIFFERENTIAL: NORMAL
FLOW TEST ORDERED: NORMAL
LAB TEST METHOD: NORMAL
NUMBER OF CELLS COLLECTED: NORMAL PER TUBE
PATH REPORT.TOTAL CANCER: NORMAL
SIGNATURE COMMENT: NORMAL
SPECIMEN VIABILITY: NORMAL

## 2025-08-14 PROCEDURE — G2211 COMPLEX E/M VISIT ADD ON: HCPCS | Performed by: RADIOLOGY

## 2025-08-14 PROCEDURE — 99215 OFFICE O/P EST HI 40 MIN: CPT | Mod: GC | Performed by: RADIOLOGY

## 2025-08-14 PROCEDURE — 99205 OFFICE O/P NEW HI 60 MIN: CPT | Performed by: RADIOLOGY

## 2025-08-14 RX ORDER — ONDANSETRON 8 MG/1
8 TABLET, FILM COATED ORAL AS NEEDED
Qty: 90 TABLET | Refills: 3 | Status: SHIPPED | OUTPATIENT
Start: 2025-08-14 | End: 2025-11-12

## 2025-08-14 ASSESSMENT — ENCOUNTER SYMPTOMS
FATIGUE: 1
CARDIOVASCULAR NEGATIVE: 1
EYES NEGATIVE: 1
ABDOMINAL DISTENTION: 1
ENDOCRINE NEGATIVE: 1
PSYCHIATRIC NEGATIVE: 1
SHORTNESS OF BREATH: 1
HEMATOLOGIC/LYMPHATIC NEGATIVE: 1
NEUROLOGICAL NEGATIVE: 1
MUSCULOSKELETAL NEGATIVE: 1

## 2025-08-14 ASSESSMENT — PATIENT HEALTH QUESTIONNAIRE - PHQ9
1. LITTLE INTEREST OR PLEASURE IN DOING THINGS: NOT AT ALL
2. FEELING DOWN, DEPRESSED OR HOPELESS: NOT AT ALL
SUM OF ALL RESPONSES TO PHQ9 QUESTIONS 1 AND 2: 0

## 2025-08-14 ASSESSMENT — PAIN SCALES - GENERAL: PAINLEVEL_OUTOF10: 0-NO PAIN

## 2025-08-14 ASSESSMENT — COLUMBIA-SUICIDE SEVERITY RATING SCALE - C-SSRS
1. IN THE PAST MONTH, HAVE YOU WISHED YOU WERE DEAD OR WISHED YOU COULD GO TO SLEEP AND NOT WAKE UP?: NO
2. HAVE YOU ACTUALLY HAD ANY THOUGHTS OF KILLING YOURSELF?: NO
6. HAVE YOU EVER DONE ANYTHING, STARTED TO DO ANYTHING, OR PREPARED TO DO ANYTHING TO END YOUR LIFE?: NO

## 2025-08-15 ENCOUNTER — TELEPHONE (OUTPATIENT)
Dept: RADIATION ONCOLOGY | Facility: HOSPITAL | Age: 70
End: 2025-08-15
Payer: MEDICARE

## 2025-08-15 LAB
PATH REPORT.COMMENTS IMP SPEC: NORMAL
PATH REPORT.FINAL DX SPEC: NORMAL
PATH REPORT.GROSS SPEC: NORMAL
PATH REPORT.MICROSCOPIC SPEC OTHER STN: NORMAL
PATH REPORT.RELEVANT HX SPEC: NORMAL
PATH REPORT.RELEVANT HX SPEC: NORMAL
PATH REPORT.TOTAL CANCER: NORMAL

## 2025-08-18 ENCOUNTER — HOSPITAL ENCOUNTER (OUTPATIENT)
Dept: RADIOLOGY | Facility: EXTERNAL LOCATION | Age: 70
Discharge: HOME | End: 2025-08-18

## 2025-08-18 ENCOUNTER — HOSPITAL ENCOUNTER (OUTPATIENT)
Dept: RADIATION ONCOLOGY | Facility: HOSPITAL | Age: 70
Setting detail: RADIATION/ONCOLOGY SERIES
Discharge: HOME | End: 2025-08-18
Payer: MEDICARE

## 2025-08-18 DIAGNOSIS — C88.40 EXTRANODAL MARGINAL ZONE B-CELL LYMPHOMA OF MUCOSA-ASSOCIATED LYMPHOID TISSUE: ICD-10-CM

## 2025-08-18 DIAGNOSIS — C88.40 EXTRANODAL MARGINAL ZONE B-CELL LYMPHOMA OF MUCOSA-ASSOCIATED LYMPHOID TISSUE: Primary | ICD-10-CM

## 2025-08-18 PROCEDURE — 77334 RADIATION TREATMENT AID(S): CPT | Performed by: STUDENT IN AN ORGANIZED HEALTH CARE EDUCATION/TRAINING PROGRAM

## 2025-08-21 ENCOUNTER — TELEPHONE (OUTPATIENT)
Dept: ADMISSION | Facility: HOSPITAL | Age: 70
End: 2025-08-21
Payer: MEDICARE

## 2025-08-22 ENCOUNTER — TELEPHONE (OUTPATIENT)
Dept: INTERNAL MEDICINE | Facility: HOSPITAL | Age: 70
End: 2025-08-22
Payer: MEDICARE

## 2025-08-22 ENCOUNTER — TELEPHONE (OUTPATIENT)
Dept: RADIATION ONCOLOGY | Facility: HOSPITAL | Age: 70
End: 2025-08-22
Payer: MEDICARE

## 2025-08-22 LAB
CHROM ANALY OVERALL INTERP-IMP: NORMAL
ELECTRONICALLY SIGNED BY CYTOGENETICS: NORMAL

## 2025-08-25 ENCOUNTER — TELEPHONE (OUTPATIENT)
Dept: RADIATION ONCOLOGY | Facility: HOSPITAL | Age: 70
End: 2025-08-25
Payer: MEDICARE

## 2025-08-26 ENCOUNTER — TELEPHONE (OUTPATIENT)
Dept: PRIMARY CARE | Facility: CLINIC | Age: 70
End: 2025-08-26
Payer: MEDICARE

## 2025-08-26 DIAGNOSIS — Z12.31 ENCOUNTER FOR SCREENING MAMMOGRAM FOR MALIGNANT NEOPLASM OF BREAST: ICD-10-CM

## 2025-08-26 DIAGNOSIS — I25.10 CORONARY ARTERY CALCIFICATION: ICD-10-CM

## 2025-08-26 DIAGNOSIS — I10 PRIMARY HYPERTENSION: ICD-10-CM

## 2025-08-26 DIAGNOSIS — Z12.39 BREAST SCREENING: Primary | ICD-10-CM

## 2025-08-26 DIAGNOSIS — I25.84 CORONARY ATHEROSCLEROSIS DUE TO CALCIFIED CORONARY LESION: ICD-10-CM

## 2025-08-26 DIAGNOSIS — I25.10 CORONARY ATHEROSCLEROSIS DUE TO CALCIFIED CORONARY LESION: ICD-10-CM

## 2025-08-26 DIAGNOSIS — E78.2 MIXED HYPERLIPIDEMIA: ICD-10-CM

## 2025-08-26 RX ORDER — TELMISARTAN 40 MG/1
40 TABLET ORAL DAILY
Qty: 30 TABLET | Refills: 5 | Status: SHIPPED | OUTPATIENT
Start: 2025-08-26 | End: 2025-08-29 | Stop reason: SDUPTHER

## 2025-08-28 DIAGNOSIS — I25.10 CORONARY ATHEROSCLEROSIS DUE TO CALCIFIED CORONARY LESION: ICD-10-CM

## 2025-08-28 DIAGNOSIS — E78.2 MIXED HYPERLIPIDEMIA: ICD-10-CM

## 2025-08-28 DIAGNOSIS — I10 PRIMARY HYPERTENSION: ICD-10-CM

## 2025-08-28 DIAGNOSIS — I25.84 CORONARY ATHEROSCLEROSIS DUE TO CALCIFIED CORONARY LESION: ICD-10-CM

## 2025-08-28 DIAGNOSIS — I25.10 CORONARY ARTERY CALCIFICATION: ICD-10-CM

## 2025-08-29 ENCOUNTER — HOSPITAL ENCOUNTER (OUTPATIENT)
Dept: RADIATION ONCOLOGY | Facility: HOSPITAL | Age: 70
Setting detail: RADIATION/ONCOLOGY SERIES
Discharge: HOME | End: 2025-08-29
Payer: MEDICARE

## 2025-08-29 PROCEDURE — 77300 RADIATION THERAPY DOSE PLAN: CPT | Performed by: RADIOLOGY

## 2025-08-29 PROCEDURE — 77301 RADIOTHERAPY DOSE PLAN IMRT: CPT | Performed by: RADIOLOGY

## 2025-08-29 PROCEDURE — 77293 RESPIRATOR MOTION MGMT SIMUL: CPT | Performed by: RADIOLOGY

## 2025-08-29 PROCEDURE — 77338 DESIGN MLC DEVICE FOR IMRT: CPT | Performed by: RADIOLOGY

## 2025-08-29 RX ORDER — TELMISARTAN 40 MG/1
40 TABLET ORAL DAILY
Qty: 90 TABLET | Refills: 1 | Status: SHIPPED | OUTPATIENT
Start: 2025-08-29 | End: 2026-02-25

## 2025-08-29 RX ORDER — ASPIRIN 81 MG/1
81 TABLET ORAL DAILY
Qty: 90 TABLET | Refills: 3 | Status: SHIPPED | OUTPATIENT
Start: 2025-08-29

## 2025-09-02 ENCOUNTER — APPOINTMENT (OUTPATIENT)
Dept: HEMATOLOGY/ONCOLOGY | Facility: HOSPITAL | Age: 70
End: 2025-09-02
Payer: MEDICARE

## 2025-09-03 ENCOUNTER — HOSPITAL ENCOUNTER (OUTPATIENT)
Dept: RADIATION ONCOLOGY | Facility: HOSPITAL | Age: 70
Setting detail: RADIATION/ONCOLOGY SERIES
Discharge: HOME | End: 2025-09-03
Payer: MEDICARE

## 2025-09-03 DIAGNOSIS — C88.40 EXTRANODAL MARGINAL ZONE B-CELL LYMPHOMA OF MUCOSA-ASSOCIATED LYMPHOID TISSUE (MALT-LYMPHOMA) NOT HAVING ACHIEVED REMISSION: ICD-10-CM

## 2025-09-03 LAB
RAD ONC MSQ ACTUAL FRACTIONS DELIVERED: 1
RAD ONC MSQ ACTUAL SESSION DELIVERED DOSE: 150 CGRAY
RAD ONC MSQ ACTUAL TOTAL DOSE: 150 CGRAY
RAD ONC MSQ ELAPSED DAYS: 0
RAD ONC MSQ LAST DATE: NORMAL
RAD ONC MSQ PRESCRIBED FRACTIONAL DOSE: 150 CGRAY
RAD ONC MSQ PRESCRIBED NUMBER OF FRACTIONS: 16
RAD ONC MSQ PRESCRIBED TECHNIQUE: NORMAL
RAD ONC MSQ PRESCRIBED TOTAL DOSE: 2400 CGRAY
RAD ONC MSQ PRESCRIPTION PATTERN COMMENT: NORMAL
RAD ONC MSQ START DATE: NORMAL
RAD ONC MSQ TREATMENT COURSE NUMBER: 1
RAD ONC MSQ TREATMENT SITE: NORMAL

## 2025-09-03 PROCEDURE — 77336 RADIATION PHYSICS CONSULT: CPT | Performed by: RADIOLOGY

## 2025-09-03 PROCEDURE — 77386 HC INTENSITY-MODULATED RADIATION THERAPY (IMRT), COMPLEX: CPT | Performed by: RADIOLOGY

## 2025-09-04 ENCOUNTER — APPOINTMENT (OUTPATIENT)
Dept: RADIOLOGY | Facility: CLINIC | Age: 70
End: 2025-09-04
Payer: MEDICARE

## 2025-09-04 ENCOUNTER — HOSPITAL ENCOUNTER (OUTPATIENT)
Dept: RADIATION ONCOLOGY | Facility: HOSPITAL | Age: 70
Setting detail: RADIATION/ONCOLOGY SERIES
Discharge: HOME | End: 2025-09-04
Payer: MEDICARE

## 2025-09-04 DIAGNOSIS — C88.40 EXTRANODAL MARGINAL ZONE B-CELL LYMPHOMA OF MUCOSA-ASSOCIATED LYMPHOID TISSUE (MALT-LYMPHOMA) NOT HAVING ACHIEVED REMISSION: ICD-10-CM

## 2025-09-04 DIAGNOSIS — Z51.0 ENCOUNTER FOR ANTINEOPLASTIC RADIATION THERAPY: ICD-10-CM

## 2025-09-04 LAB
RAD ONC MSQ ACTUAL FRACTIONS DELIVERED: 2
RAD ONC MSQ ACTUAL SESSION DELIVERED DOSE: 150 CGRAY
RAD ONC MSQ ACTUAL TOTAL DOSE: 300 CGRAY
RAD ONC MSQ ELAPSED DAYS: 1
RAD ONC MSQ LAST DATE: NORMAL
RAD ONC MSQ PRESCRIBED FRACTIONAL DOSE: 150 CGRAY
RAD ONC MSQ PRESCRIBED NUMBER OF FRACTIONS: 16
RAD ONC MSQ PRESCRIBED TECHNIQUE: NORMAL
RAD ONC MSQ PRESCRIBED TOTAL DOSE: 2400 CGRAY
RAD ONC MSQ PRESCRIPTION PATTERN COMMENT: NORMAL
RAD ONC MSQ START DATE: NORMAL
RAD ONC MSQ TREATMENT COURSE NUMBER: 1
RAD ONC MSQ TREATMENT SITE: NORMAL

## 2025-09-04 PROCEDURE — 77386 HC INTENSITY-MODULATED RADIATION THERAPY (IMRT), COMPLEX: CPT | Performed by: RADIOLOGY

## 2025-09-05 ENCOUNTER — HOSPITAL ENCOUNTER (OUTPATIENT)
Dept: RADIATION ONCOLOGY | Facility: HOSPITAL | Age: 70
Setting detail: RADIATION/ONCOLOGY SERIES
Discharge: HOME | End: 2025-09-05
Payer: MEDICARE

## 2025-09-05 DIAGNOSIS — C88.40 EXTRANODAL MARGINAL ZONE B-CELL LYMPHOMA OF MUCOSA-ASSOCIATED LYMPHOID TISSUE (MALT-LYMPHOMA) NOT HAVING ACHIEVED REMISSION: ICD-10-CM

## 2025-09-05 DIAGNOSIS — Z51.0 ENCOUNTER FOR ANTINEOPLASTIC RADIATION THERAPY: ICD-10-CM

## 2025-09-05 LAB
RAD ONC MSQ ACTUAL FRACTIONS DELIVERED: 3
RAD ONC MSQ ACTUAL SESSION DELIVERED DOSE: 150 CGRAY
RAD ONC MSQ ACTUAL TOTAL DOSE: 450 CGRAY
RAD ONC MSQ ELAPSED DAYS: 2
RAD ONC MSQ LAST DATE: NORMAL
RAD ONC MSQ PRESCRIBED FRACTIONAL DOSE: 150 CGRAY
RAD ONC MSQ PRESCRIBED NUMBER OF FRACTIONS: 16
RAD ONC MSQ PRESCRIBED TECHNIQUE: NORMAL
RAD ONC MSQ PRESCRIBED TOTAL DOSE: 2400 CGRAY
RAD ONC MSQ PRESCRIPTION PATTERN COMMENT: NORMAL
RAD ONC MSQ START DATE: NORMAL
RAD ONC MSQ TREATMENT COURSE NUMBER: 1
RAD ONC MSQ TREATMENT SITE: NORMAL

## 2025-09-05 PROCEDURE — 77386 HC INTENSITY-MODULATED RADIATION THERAPY (IMRT), COMPLEX: CPT | Performed by: RADIOLOGY

## 2025-11-14 ENCOUNTER — APPOINTMENT (OUTPATIENT)
Dept: PRIMARY CARE | Facility: CLINIC | Age: 70
End: 2025-11-14
Payer: MEDICARE